# Patient Record
Sex: FEMALE | Race: WHITE | NOT HISPANIC OR LATINO
[De-identification: names, ages, dates, MRNs, and addresses within clinical notes are randomized per-mention and may not be internally consistent; named-entity substitution may affect disease eponyms.]

---

## 2017-10-16 ENCOUNTER — APPOINTMENT (OUTPATIENT)
Dept: DERMATOLOGY | Facility: CLINIC | Age: 62
End: 2017-10-16
Payer: COMMERCIAL

## 2017-10-16 VITALS — DIASTOLIC BLOOD PRESSURE: 58 MMHG | SYSTOLIC BLOOD PRESSURE: 110 MMHG

## 2017-10-16 DIAGNOSIS — L82.0 INFLAMED SEBORRHEIC KERATOSIS: ICD-10-CM

## 2017-10-16 PROCEDURE — 17110 DESTRUCTION B9 LES UP TO 14: CPT

## 2017-10-16 PROCEDURE — 99203 OFFICE O/P NEW LOW 30 MIN: CPT | Mod: 25

## 2017-10-16 PROCEDURE — 17000 DESTRUCT PREMALG LESION: CPT | Mod: 59

## 2017-10-16 RX ORDER — AZITHROMYCIN 250 MG/1
250 TABLET, FILM COATED ORAL
Qty: 6 | Refills: 0 | Status: DISCONTINUED | COMMUNITY
Start: 2017-10-03

## 2017-12-14 ENCOUNTER — RESULT REVIEW (OUTPATIENT)
Age: 62
End: 2017-12-14

## 2018-02-27 ENCOUNTER — APPOINTMENT (OUTPATIENT)
Dept: OPHTHALMOLOGY | Facility: CLINIC | Age: 63
End: 2018-02-27
Payer: COMMERCIAL

## 2018-02-27 PROCEDURE — 92134 CPTRZ OPH DX IMG PST SGM RTA: CPT

## 2018-02-27 PROCEDURE — 92004 COMPRE OPH EXAM NEW PT 1/>: CPT

## 2018-04-10 ENCOUNTER — APPOINTMENT (OUTPATIENT)
Dept: OPHTHALMOLOGY | Facility: CLINIC | Age: 63
End: 2018-04-10
Payer: COMMERCIAL

## 2018-04-10 PROCEDURE — 92014 COMPRE OPH EXAM EST PT 1/>: CPT

## 2018-04-18 ENCOUNTER — MEDICATION RENEWAL (OUTPATIENT)
Age: 63
End: 2018-04-18

## 2018-09-17 ENCOUNTER — APPOINTMENT (OUTPATIENT)
Dept: INFECTIOUS DISEASE | Facility: CLINIC | Age: 63
End: 2018-09-17
Payer: SELF-PAY

## 2018-09-17 PROCEDURE — 90691 TYPHOID VACCINE IM: CPT

## 2018-09-17 PROCEDURE — 90472 IMMUNIZATION ADMIN EACH ADD: CPT | Mod: NC

## 2018-09-17 PROCEDURE — 99401 PREV MED CNSL INDIV APPRX 15: CPT | Mod: 25

## 2018-09-17 PROCEDURE — G0008: CPT | Mod: NC

## 2018-09-17 PROCEDURE — 90686 IIV4 VACC NO PRSV 0.5 ML IM: CPT

## 2019-02-07 ENCOUNTER — APPOINTMENT (OUTPATIENT)
Dept: OPHTHALMOLOGY | Facility: CLINIC | Age: 64
End: 2019-02-07
Payer: COMMERCIAL

## 2019-02-07 DIAGNOSIS — H43.392 OTHER VITREOUS OPACITIES, LEFT EYE: ICD-10-CM

## 2019-02-07 PROCEDURE — 92014 COMPRE OPH EXAM EST PT 1/>: CPT

## 2019-02-07 PROCEDURE — 92134 CPTRZ OPH DX IMG PST SGM RTA: CPT

## 2019-05-13 ENCOUNTER — APPOINTMENT (OUTPATIENT)
Dept: ORTHOPEDIC SURGERY | Facility: CLINIC | Age: 64
End: 2019-05-13
Payer: COMMERCIAL

## 2019-05-13 VITALS — HEIGHT: 63.5 IN | WEIGHT: 110 LBS | BODY MASS INDEX: 19.25 KG/M2

## 2019-05-13 PROCEDURE — 73564 X-RAY EXAM KNEE 4 OR MORE: CPT | Mod: RT

## 2019-05-13 PROCEDURE — 99203 OFFICE O/P NEW LOW 30 MIN: CPT

## 2019-07-02 NOTE — PHYSICAL EXAM
[de-identified] : Oriented to time, place, person\par Mood: Normal\par Affect: Normal\par Appearance: Healthy, well appearing, no acute distress.\par Gait: Normal\par Assistive Devices: None\par \par Right knee exam\par \par Skin: Prior incisions clean, dry, intact\par Inspection: No obvious malalignment, no masses, minimal swelling, trace effusion\par Pulses: 2+ DP/PT pulses\par ROM: 0-125 degrees of flexion. Mild pain with deep knee flexion/extension.\par Tenderness: Minimal MJLT. No LJLT. No pain over the patella facets. No pain to the quadriceps tendon. No pain to the patella tendon. No posterior knee tenderness.\par Stability: Stable to varus, valgus. IA lachman testing. Negative anterior drawer, negative posterior drawer.\par Strength: 5/5 Q/H/TA/GS/EHL, without atrophy\par Neuro: In tact to light touch throughout, DTR's normal\par Additional tests: Neg mild ative McMurrays test, Negative patellar grind test. \par \par  [de-identified] : \par The following radiographs were ordered and read by me during this patients visit. I reviewed each radiograph in detail with the patient and discussed the findings as highlighted below. \par \par 4 views of the right knee were obtained today that show no acute fracture or dislocation. There is no medial, no lateral and min patellofemoral degenerative change seen. There is no significant malalignment. Prior ACL reconstruction with vertical graft

## 2019-07-02 NOTE — DISCUSSION/SUMMARY
[de-identified] : 63-year-old female with right knee pain\par \par The patient presents with pain to the right knee. 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 Described that this is likely due to overuse, and age-related "wear and tear". Pain may be related to breakdown of the chondral surfaces, cartilage, or ligaments of the knee. Radiographs show prior ACL reconstruction with local graft. No significant instability..\par \par Recommendation: Conservative care & observation; including rest/activity avoidance until less symptomatic with subsequent gradual return to full low impact activity as tolerated. Patient may also use OTC NSAID's or acetaminophen as tolerated, with application of ice/heat to the area 2-3x daily for 20 minutes after periods of activity. \par \par Future treatment may include use of advanced imaging to determine degree of internal derangement and arthrosis, injection therapy (cortisone vs. visco-supplementation).\par \par Followup as needed if pain persists for further evaluation and treatment\par

## 2019-07-02 NOTE — HISTORY OF PRESENT ILLNESS
[de-identified] : 63 year old female s/p right knee ACL allograft reconstruction x 10 years ago presents today with right knee pain x 1 week. No injury reported. Patient thinks that her pain is associated with running. The pain is constant  brought on with with walking, and kneeling. Localizes that pain mainly to the posterior aspect of the knee. Reports buckling sensation. Takes Aleve for pain. Denies locking, locking, numbness or tingling. \par \par The patient's past medical history, past surgical history, medications and allergies were reviewed by me today with the patient and documented accordingly. In addition, the patient's family and social history, which were noncontributory to this visit, were reviewed also.\par

## 2019-11-18 ENCOUNTER — TRANSCRIPTION ENCOUNTER (OUTPATIENT)
Age: 64
End: 2019-11-18

## 2019-12-09 ENCOUNTER — APPOINTMENT (OUTPATIENT)
Dept: OPHTHALMOLOGY | Facility: CLINIC | Age: 64
End: 2019-12-09
Payer: COMMERCIAL

## 2019-12-09 ENCOUNTER — NON-APPOINTMENT (OUTPATIENT)
Age: 64
End: 2019-12-09

## 2019-12-09 PROCEDURE — 92014 COMPRE OPH EXAM EST PT 1/>: CPT

## 2019-12-10 ENCOUNTER — TRANSCRIPTION ENCOUNTER (OUTPATIENT)
Age: 64
End: 2019-12-10

## 2020-04-25 ENCOUNTER — MESSAGE (OUTPATIENT)
Age: 65
End: 2020-04-25

## 2020-05-01 LAB
SARS-COV-2 IGG SERPL IA-ACNC: <0.1 INDEX
SARS-COV-2 IGG SERPL QL IA: NEGATIVE

## 2020-10-13 ENCOUNTER — APPOINTMENT (OUTPATIENT)
Dept: ORTHOPEDIC SURGERY | Facility: CLINIC | Age: 65
End: 2020-10-13
Payer: COMMERCIAL

## 2020-10-13 VITALS — HEIGHT: 63 IN | WEIGHT: 110 LBS | BODY MASS INDEX: 19.49 KG/M2

## 2020-10-13 PROCEDURE — 99204 OFFICE O/P NEW MOD 45 MIN: CPT

## 2020-10-13 PROCEDURE — 73610 X-RAY EXAM OF ANKLE: CPT | Mod: LT

## 2020-10-13 PROCEDURE — 73630 X-RAY EXAM OF FOOT: CPT | Mod: LT

## 2020-10-13 NOTE — PHYSICAL EXAM
[de-identified] : Constitutional: Well-nourished, well-developed, No acute distress\par Respiratory:  Good respiratory effort, no SOB\par Lymphatic: No regional lymphadenopathy, no lymphedema\par Psychiatric: Pleasant and normal affect, alert and oriented x3\par Skin: Clean dry and intact B/L UE/LE\par Musculoskeletal: normal except where as noted in regional exam\par \par \par left Foot:\par APPEARANCE: + swelling, bruising, dorsum of foot and ankle\par POSITIVE TENDERNESS: 5th metatarsal base, cuboid, ATFL, deltoid ligament\par ROM: normal throughout foot, ankle, and digits. \par NEURO: Normal sensation of LE, DTRs 2+/4 patella and achilles\par PULSES: 2+ DP/PT pulses\par \par B/L Knees: No asymmetry, malalignment, or swelling, Full ROM, 5/5 strength in Flex/Ext, Joints stable\par \par \par \par  [de-identified] : \par The following radiographs were ordered and read by me during this patient's visit. I reviewed each radiograph in detail with the patient and discussed the findings as highlighted below. \par \par 3 views of the left foot and ankle  were obtained today that show a fracture at the base of the 5th metatarsal.  No evidence of ankle fracture.

## 2020-10-13 NOTE — ADDENDUM
[FreeTextEntry1] : I, Samantha Mcrae ATC, assisted with the history and documentation for Dr. Zheng on this date 10/13/2020.\par

## 2020-10-13 NOTE — HISTORY OF PRESENT ILLNESS
[Pain Location] : pain [Stable] : stable [___ wks] : [unfilled] week(s) ago [5] : an average pain level of 5/10 [4] : a minimum pain level of 4/10 [7] : a maximum pain level of 7/10 [Walking] : walking [Standing] : standing [Ice] : relieved by ice [Physical Therapy] : relieved by physical therapy [de-identified] : DALIA JOSHUA is a 65 year female runner who presents with left foot pain. Pain is primarily located at the lateral ankle. It began on 10/8/2020 while out on a run. She stepped onto a hard fruit or tree nut on the ground and rolled her ankle. She denies any pop, but did have immediate pain and swelling. Pain is described as achy in nature, 5/10 in intensity, worse with plantar flexion and inversion, better with rest and ice. The first two days she took Aleve but is no longer taking pain medication. \par She went to physical therapy that same day [id number] and was sent here to get xrays. No previous injuries. \par \par \par

## 2020-11-10 ENCOUNTER — APPOINTMENT (OUTPATIENT)
Dept: ORTHOPEDIC SURGERY | Facility: CLINIC | Age: 65
End: 2020-11-10
Payer: COMMERCIAL

## 2020-11-10 PROCEDURE — 99072 ADDL SUPL MATRL&STAF TM PHE: CPT

## 2020-11-10 PROCEDURE — 73630 X-RAY EXAM OF FOOT: CPT | Mod: LT

## 2020-11-10 PROCEDURE — 99214 OFFICE O/P EST MOD 30 MIN: CPT

## 2020-11-24 ENCOUNTER — APPOINTMENT (OUTPATIENT)
Dept: ORTHOPEDIC SURGERY | Facility: CLINIC | Age: 65
End: 2020-11-24
Payer: COMMERCIAL

## 2020-11-24 DIAGNOSIS — S92.353A DISPLACED FRACTURE OF FIFTH METATARSAL BONE, UNSPECIFIED FOOT, INITIAL ENCOUNTER FOR CLOSED FRACTURE: ICD-10-CM

## 2020-11-24 PROCEDURE — 73610 X-RAY EXAM OF ANKLE: CPT | Mod: LT

## 2020-11-24 PROCEDURE — 73630 X-RAY EXAM OF FOOT: CPT | Mod: LT

## 2020-11-24 PROCEDURE — 99214 OFFICE O/P EST MOD 30 MIN: CPT

## 2021-03-11 ENCOUNTER — FORM ENCOUNTER (OUTPATIENT)
Age: 66
End: 2021-03-11

## 2021-03-12 ENCOUNTER — RESULT REVIEW (OUTPATIENT)
Age: 66
End: 2021-03-12

## 2021-03-12 ENCOUNTER — APPOINTMENT (OUTPATIENT)
Dept: OBGYN | Facility: CLINIC | Age: 66
End: 2021-03-12
Payer: COMMERCIAL

## 2021-03-12 PROCEDURE — 99386 PREV VISIT NEW AGE 40-64: CPT

## 2021-03-12 PROCEDURE — 99072 ADDL SUPL MATRL&STAF TM PHE: CPT

## 2021-05-03 ENCOUNTER — APPOINTMENT (OUTPATIENT)
Dept: MAMMOGRAPHY | Facility: IMAGING CENTER | Age: 66
End: 2021-05-03
Payer: COMMERCIAL

## 2021-05-03 ENCOUNTER — RESULT REVIEW (OUTPATIENT)
Age: 66
End: 2021-05-03

## 2021-05-03 ENCOUNTER — FORM ENCOUNTER (OUTPATIENT)
Age: 66
End: 2021-05-03

## 2021-05-03 ENCOUNTER — APPOINTMENT (OUTPATIENT)
Dept: RADIOLOGY | Facility: IMAGING CENTER | Age: 66
End: 2021-05-03
Payer: COMMERCIAL

## 2021-05-03 ENCOUNTER — OUTPATIENT (OUTPATIENT)
Dept: OUTPATIENT SERVICES | Facility: HOSPITAL | Age: 66
LOS: 1 days | End: 2021-05-03
Payer: COMMERCIAL

## 2021-05-03 ENCOUNTER — APPOINTMENT (OUTPATIENT)
Dept: ULTRASOUND IMAGING | Facility: IMAGING CENTER | Age: 66
End: 2021-05-03
Payer: COMMERCIAL

## 2021-05-03 DIAGNOSIS — Z00.8 ENCOUNTER FOR OTHER GENERAL EXAMINATION: ICD-10-CM

## 2021-05-03 PROCEDURE — 77067 SCR MAMMO BI INCL CAD: CPT | Mod: 26

## 2021-05-03 PROCEDURE — 77063 BREAST TOMOSYNTHESIS BI: CPT

## 2021-05-03 PROCEDURE — 77063 BREAST TOMOSYNTHESIS BI: CPT | Mod: 26

## 2021-05-03 PROCEDURE — 77067 SCR MAMMO BI INCL CAD: CPT

## 2021-05-03 PROCEDURE — 76641 ULTRASOUND BREAST COMPLETE: CPT | Mod: 26,50

## 2021-05-03 PROCEDURE — 76641 ULTRASOUND BREAST COMPLETE: CPT

## 2021-06-09 ENCOUNTER — APPOINTMENT (OUTPATIENT)
Dept: DERMATOLOGY | Facility: CLINIC | Age: 66
End: 2021-06-09
Payer: COMMERCIAL

## 2021-06-09 ENCOUNTER — NON-APPOINTMENT (OUTPATIENT)
Age: 66
End: 2021-06-09

## 2021-06-09 ENCOUNTER — LABORATORY RESULT (OUTPATIENT)
Age: 66
End: 2021-06-09

## 2021-06-09 VITALS — WEIGHT: 110 LBS | BODY MASS INDEX: 19.49 KG/M2 | HEIGHT: 63 IN

## 2021-06-09 DIAGNOSIS — D48.7 NEOPLASM OF UNCERTAIN BEHAVIOR OF OTHER SPECIFIED SITES: ICD-10-CM

## 2021-06-09 DIAGNOSIS — L81.4 OTHER MELANIN HYPERPIGMENTATION: ICD-10-CM

## 2021-06-09 DIAGNOSIS — D48.5 NEOPLASM OF UNCERTAIN BEHAVIOR OF SKIN: ICD-10-CM

## 2021-06-09 PROCEDURE — 99072 ADDL SUPL MATRL&STAF TM PHE: CPT

## 2021-06-09 PROCEDURE — 99203 OFFICE O/P NEW LOW 30 MIN: CPT | Mod: 25

## 2021-06-09 PROCEDURE — 11103 TANGNTL BX SKIN EA SEP/ADDL: CPT | Mod: 76

## 2021-06-09 PROCEDURE — 11102 TANGNTL BX SKIN SINGLE LES: CPT

## 2021-07-15 ENCOUNTER — NON-APPOINTMENT (OUTPATIENT)
Age: 66
End: 2021-07-15

## 2021-07-21 ENCOUNTER — APPOINTMENT (OUTPATIENT)
Dept: RADIOLOGY | Facility: IMAGING CENTER | Age: 66
End: 2021-07-21
Payer: COMMERCIAL

## 2021-07-21 ENCOUNTER — RESULT REVIEW (OUTPATIENT)
Age: 66
End: 2021-07-21

## 2021-07-21 ENCOUNTER — OUTPATIENT (OUTPATIENT)
Dept: OUTPATIENT SERVICES | Facility: HOSPITAL | Age: 66
LOS: 1 days | End: 2021-07-21
Payer: COMMERCIAL

## 2021-07-21 DIAGNOSIS — Z00.8 ENCOUNTER FOR OTHER GENERAL EXAMINATION: ICD-10-CM

## 2021-07-21 PROCEDURE — 77080 DXA BONE DENSITY AXIAL: CPT | Mod: 26

## 2021-07-21 PROCEDURE — 77080 DXA BONE DENSITY AXIAL: CPT

## 2021-07-27 ENCOUNTER — FORM ENCOUNTER (OUTPATIENT)
Age: 66
End: 2021-07-27

## 2021-08-15 ENCOUNTER — FORM ENCOUNTER (OUTPATIENT)
Age: 66
End: 2021-08-15

## 2021-08-23 ENCOUNTER — APPOINTMENT (OUTPATIENT)
Dept: DERMATOLOGY | Facility: CLINIC | Age: 66
End: 2021-08-23
Payer: COMMERCIAL

## 2021-08-23 PROCEDURE — 17000 DESTRUCT PREMALG LESION: CPT

## 2021-08-31 ENCOUNTER — OUTPATIENT (OUTPATIENT)
Dept: OUTPATIENT SERVICES | Facility: HOSPITAL | Age: 66
LOS: 1 days | End: 2021-08-31
Payer: COMMERCIAL

## 2021-08-31 ENCOUNTER — APPOINTMENT (OUTPATIENT)
Dept: CT IMAGING | Facility: IMAGING CENTER | Age: 66
End: 2021-08-31
Payer: COMMERCIAL

## 2021-08-31 DIAGNOSIS — I77.810 THORACIC AORTIC ECTASIA: ICD-10-CM

## 2021-08-31 PROCEDURE — 74177 CT ABD & PELVIS W/CONTRAST: CPT | Mod: 26

## 2021-08-31 PROCEDURE — 82565 ASSAY OF CREATININE: CPT

## 2021-08-31 PROCEDURE — 74177 CT ABD & PELVIS W/CONTRAST: CPT

## 2021-09-03 ENCOUNTER — APPOINTMENT (OUTPATIENT)
Dept: GASTROENTEROLOGY | Facility: CLINIC | Age: 66
End: 2021-09-03
Payer: COMMERCIAL

## 2021-09-03 VITALS
BODY MASS INDEX: 18.78 KG/M2 | SYSTOLIC BLOOD PRESSURE: 120 MMHG | TEMPERATURE: 98 F | OXYGEN SATURATION: 98 % | DIASTOLIC BLOOD PRESSURE: 75 MMHG | HEIGHT: 63 IN | WEIGHT: 106 LBS | RESPIRATION RATE: 18 BRPM | HEART RATE: 40 BPM

## 2021-09-03 DIAGNOSIS — K85.00 IDIOPATHIC ACUTE PANCREATITIS WITHOUT NECROSIS OR INFECTION: ICD-10-CM

## 2021-09-03 PROCEDURE — 99204 OFFICE O/P NEW MOD 45 MIN: CPT

## 2021-09-03 RX ORDER — TOBRAMYCIN AND DEXAMETHASONE 3; 1 MG/ML; MG/ML
0.3-0.1 SUSPENSION/ DROPS OPHTHALMIC 3 TIMES DAILY
Qty: 5 | Refills: 3 | Status: COMPLETED | COMMUNITY
Start: 2018-04-18 | End: 2021-09-03

## 2021-09-03 RX ORDER — FLUOROURACIL 50 MG/G
5 CREAM TOPICAL
Qty: 1 | Refills: 0 | Status: COMPLETED | COMMUNITY
Start: 2021-06-09 | End: 2021-09-03

## 2021-09-03 RX ORDER — AZITHROMYCIN 250 MG/1
250 TABLET, FILM COATED ORAL
Qty: 6 | Refills: 0 | Status: COMPLETED | COMMUNITY
Start: 2018-09-17 | End: 2021-09-03

## 2021-09-03 NOTE — PHYSICAL EXAM
[General Appearance - Alert] : alert [General Appearance - In No Acute Distress] : in no acute distress [Sclera] : the sclera and conjunctiva were normal [Outer Ear] : the ears and nose were normal in appearance [Neck Appearance] : the appearance of the neck was normal [Abdomen Soft] : soft [Exaggerated Use Of Accessory Muscles For Inspiration] : no accessory muscle use [Abdomen Tenderness] : non-tender [Involuntary Movements] : no involuntary movements were seen [Skin Color & Pigmentation] : normal skin color and pigmentation [Skin Turgor] : normal skin turgor [] : no rash [No Focal Deficits] : no focal deficits [Oriented To Time, Place, And Person] : oriented to person, place, and time [Impaired Insight] : insight and judgment were intact [Affect] : the affect was normal

## 2021-09-03 NOTE — HISTORY OF PRESENT ILLNESS
[de-identified] : DALIA JOSHUA is a 66 year old female with no significant medical history\par \par She is presenting today for evaluation of acute pancreatitis.  She reports that out of the blue around Aug 16, she developed significant epigastric pain that was constant and associated with some nausea. She did not feel like it varied with food or position. She did not have emesis. She saw her PCP who recommended empiric treatment with PPI and Pepcid and bloodwork was obtained and a CT was ordered.  Her bloodwork came back with a lipase elevated at 3900 and otherwise normal CBC, CMP (included TB and liver tests).  She then underwent a CT A/P which showed a 2.7cm septated pancreatic uncinate/inferior head cyst as well as an enhancing focus in the small bowel of uncertain etiology.\par \par Since these tests were obtained, the significant epigastric pain has resolved, though she still notes some gas-like diffuse abdominal discomfort. She was constipated but reports her BMs are returning to normal now.  \par \par \par She drinks 1-2 glasses wine per night regularly but does not binge. She denies a history of pancreatitis in the past. She has no personal or family history of pancreatic disease or cancer.  Her father had gastric cancer at age 90 and her mother had heart failure.  No other cancers that she is aware of in the family. \par \par She had a colonoscopy 4 years ago by Dr. Head that was normal and reports she is due for one in 1 year.\par \par \par \par

## 2021-09-03 NOTE — ASSESSMENT
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 septated pancreatic uncinate/inferior head cyst\par # Gas/bloating\par # Hyper-enhancing nodularity on small bowel on CT\par \par Recs:\par - discussed possible etiologies of acute pancreatitis including this pancreatic cyst, vs ETOH, microlithiasis, etc\par - discussed types of pancreatic cysts, including pseudocysts related to pancreatitis, as well as IPMN, SCA - and that further evaluation should be performed as below to guide future treatment or surveillance plans\par - will get some baseline labs today (lipase, A1c, CA 19-9)\par - will obtain MRI/MRCP to evaluate the pancreatic cyst as well as the biliary tree/PD/pancreatic parenchyma\par - will also schedule for EGD/EUS + likely FNA of the cyst for CEA/amylase and possibly will send for [facility] high risk testing if patient agreeable\par - Will plan to discuss at pancreas cyst conference and referral to center for enrollment in surveillance protocol/addition to database\par - encouraged abstinence from ETOH, adherence to low fat diet \par - advised can stop PPI/Pepcid if not helping abdominal bloating sx and can try peppermint oil capsules\par - RTC after imaging and cyst conference discussion has taken place; at that time can also discuss abnormal CT finding of hyperenhancing nodule in small bowel loop on CT and consider CTE\par

## 2021-09-07 ENCOUNTER — APPOINTMENT (OUTPATIENT)
Dept: MRI IMAGING | Facility: IMAGING CENTER | Age: 66
End: 2021-09-07
Payer: COMMERCIAL

## 2021-09-07 ENCOUNTER — OUTPATIENT (OUTPATIENT)
Dept: OUTPATIENT SERVICES | Facility: HOSPITAL | Age: 66
LOS: 1 days | End: 2021-09-07
Payer: COMMERCIAL

## 2021-09-07 DIAGNOSIS — K85.00 IDIOPATHIC ACUTE PANCREATITIS WITHOUT NECROSIS OR INFECTION: ICD-10-CM

## 2021-09-07 DIAGNOSIS — Z00.8 ENCOUNTER FOR OTHER GENERAL EXAMINATION: ICD-10-CM

## 2021-09-07 PROCEDURE — A9585: CPT

## 2021-09-07 PROCEDURE — 74183 MRI ABD W/O CNTR FLWD CNTR: CPT

## 2021-09-07 PROCEDURE — 74183 MRI ABD W/O CNTR FLWD CNTR: CPT | Mod: 26

## 2021-09-08 ENCOUNTER — TRANSCRIPTION ENCOUNTER (OUTPATIENT)
Age: 66
End: 2021-09-08

## 2021-09-09 ENCOUNTER — TRANSCRIPTION ENCOUNTER (OUTPATIENT)
Age: 66
End: 2021-09-09

## 2021-09-10 LAB
CANCER AG19-9 SERPL-ACNC: 60 U/ML
ESTIMATED AVERAGE GLUCOSE: 114 MG/DL
HBA1C MFR BLD HPLC: 5.6 %
LPL SERPL-CCNC: 154 U/L

## 2021-09-11 ENCOUNTER — TRANSCRIPTION ENCOUNTER (OUTPATIENT)
Age: 66
End: 2021-09-11

## 2021-09-17 ENCOUNTER — TRANSCRIPTION ENCOUNTER (OUTPATIENT)
Age: 66
End: 2021-09-17

## 2021-09-26 ENCOUNTER — FORM ENCOUNTER (OUTPATIENT)
Age: 66
End: 2021-09-26

## 2021-09-26 DIAGNOSIS — Z01.818 ENCOUNTER FOR OTHER PREPROCEDURAL EXAMINATION: ICD-10-CM

## 2021-09-27 ENCOUNTER — APPOINTMENT (OUTPATIENT)
Dept: DISASTER EMERGENCY | Facility: CLINIC | Age: 66
End: 2021-09-27

## 2021-09-28 LAB — SARS-COV-2 N GENE NPH QL NAA+PROBE: NOT DETECTED

## 2021-09-30 ENCOUNTER — RESULT REVIEW (OUTPATIENT)
Age: 66
End: 2021-09-30

## 2021-09-30 ENCOUNTER — OUTPATIENT (OUTPATIENT)
Dept: OUTPATIENT SERVICES | Facility: HOSPITAL | Age: 66
LOS: 1 days | End: 2021-09-30
Payer: COMMERCIAL

## 2021-09-30 ENCOUNTER — APPOINTMENT (OUTPATIENT)
Dept: GASTROENTEROLOGY | Facility: HOSPITAL | Age: 66
End: 2021-09-30

## 2021-09-30 VITALS
RESPIRATION RATE: 19 BRPM | HEART RATE: 55 BPM | SYSTOLIC BLOOD PRESSURE: 138 MMHG | DIASTOLIC BLOOD PRESSURE: 76 MMHG | OXYGEN SATURATION: 100 %

## 2021-09-30 VITALS
SYSTOLIC BLOOD PRESSURE: 150 MMHG | RESPIRATION RATE: 16 BRPM | WEIGHT: 106.04 LBS | DIASTOLIC BLOOD PRESSURE: 73 MMHG | OXYGEN SATURATION: 100 % | HEIGHT: 63 IN | HEART RATE: 52 BPM | TEMPERATURE: 97 F

## 2021-09-30 DIAGNOSIS — Z98.890 OTHER SPECIFIED POSTPROCEDURAL STATES: Chronic | ICD-10-CM

## 2021-09-30 DIAGNOSIS — Z98.891 HISTORY OF UTERINE SCAR FROM PREVIOUS SURGERY: Chronic | ICD-10-CM

## 2021-09-30 DIAGNOSIS — K85.00 IDIOPATHIC ACUTE PANCREATITIS WITHOUT NECROSIS OR INFECTION: ICD-10-CM

## 2021-09-30 PROCEDURE — 88305 TISSUE EXAM BY PATHOLOGIST: CPT

## 2021-09-30 PROCEDURE — 43239 EGD BIOPSY SINGLE/MULTIPLE: CPT | Mod: XS

## 2021-09-30 PROCEDURE — 43238 EGD US FINE NEEDLE BX/ASPIR: CPT

## 2021-09-30 PROCEDURE — 88305 TISSUE EXAM BY PATHOLOGIST: CPT | Mod: 26

## 2021-09-30 PROCEDURE — 88173 CYTOPATH EVAL FNA REPORT: CPT

## 2021-09-30 PROCEDURE — 88173 CYTOPATH EVAL FNA REPORT: CPT | Mod: 26

## 2021-09-30 PROCEDURE — 43239 EGD BIOPSY SINGLE/MULTIPLE: CPT | Mod: 59

## 2021-09-30 PROCEDURE — 43242 EGD US FINE NEEDLE BX/ASPIR: CPT

## 2021-09-30 RX ORDER — SODIUM CHLORIDE 9 MG/ML
500 INJECTION INTRAMUSCULAR; INTRAVENOUS; SUBCUTANEOUS
Refills: 0 | Status: COMPLETED | OUTPATIENT
Start: 2021-09-30 | End: 2021-09-30

## 2021-09-30 RX ADMIN — SODIUM CHLORIDE 30 MILLILITER(S): 9 INJECTION INTRAMUSCULAR; INTRAVENOUS; SUBCUTANEOUS at 08:53

## 2021-09-30 NOTE — ASU PATIENT PROFILE, ADULT - NSICDXPASTSURGICALHX_GEN_ALL_CORE_FT
PAST SURGICAL HISTORY:  H/O  section      PAST SURGICAL HISTORY:  H/O  section     S/P ACL repair

## 2021-09-30 NOTE — PRE PROCEDURE NOTE - PRE PROCEDURE EVALUATION
Attending Physician:     Ion            Procedure: EGD EUS    Indication for Procedure: panc cyst  ________________________________________________________  PAST MEDICAL & SURGICAL HISTORY:    ALLERGIES:  Allergy Status Unknown    HOME MEDICATIONS:    AICD/PPM: [ ] yes   [ ] no    PERTINENT LAB DATA:                      PHYSICAL EXAMINATION:    T(C): --  HR: --  BP: --  RR: --  SpO2: --    Constitutional: NAD  HEENT: PERRLA, EOMI,    Neck:  No JVD  Respiratory: CTAB/L  Cardiovascular: S1 and S2  Gastrointestinal: BS+, soft, NT/ND  Extremities: No peripheral edema  Neurological: A/O x 3, no focal deficits  Psychiatric: Normal mood, normal affect  Skin: No rashes    ASA Class: I [ ]  II [ ]  III [ ]  IV [ ]    COMMENTS:    The patient is a suitable candidate for the planned procedure unless box checked [ ]  No, explain:

## 2021-10-01 ENCOUNTER — TRANSCRIPTION ENCOUNTER (OUTPATIENT)
Age: 66
End: 2021-10-01

## 2021-10-01 LAB — SURGICAL PATHOLOGY STUDY: SIGNIFICANT CHANGE UP

## 2021-10-08 ENCOUNTER — TRANSCRIPTION ENCOUNTER (OUTPATIENT)
Age: 66
End: 2021-10-08

## 2021-10-12 ENCOUNTER — NON-APPOINTMENT (OUTPATIENT)
Age: 66
End: 2021-10-12

## 2021-10-12 LAB — NON-GYNECOLOGICAL CYTOLOGY STUDY: SIGNIFICANT CHANGE UP

## 2021-10-13 ENCOUNTER — TRANSCRIPTION ENCOUNTER (OUTPATIENT)
Age: 66
End: 2021-10-13

## 2021-10-13 ENCOUNTER — NON-APPOINTMENT (OUTPATIENT)
Age: 66
End: 2021-10-13

## 2021-10-27 ENCOUNTER — TRANSCRIPTION ENCOUNTER (OUTPATIENT)
Age: 66
End: 2021-10-27

## 2021-11-09 PROBLEM — Z87.19 PERSONAL HISTORY OF OTHER DISEASES OF THE DIGESTIVE SYSTEM: Chronic | Status: ACTIVE | Noted: 2021-09-30

## 2021-11-16 ENCOUNTER — NON-APPOINTMENT (OUTPATIENT)
Age: 66
End: 2021-11-16

## 2021-11-16 ENCOUNTER — APPOINTMENT (OUTPATIENT)
Dept: INTERNAL MEDICINE | Facility: CLINIC | Age: 66
End: 2021-11-16
Payer: COMMERCIAL

## 2021-11-16 VITALS
WEIGHT: 105 LBS | DIASTOLIC BLOOD PRESSURE: 66 MMHG | BODY MASS INDEX: 18.61 KG/M2 | OXYGEN SATURATION: 100 % | TEMPERATURE: 97.34 F | HEART RATE: 40 BPM | HEIGHT: 63 IN | SYSTOLIC BLOOD PRESSURE: 112 MMHG

## 2021-11-16 PROCEDURE — 93000 ELECTROCARDIOGRAM COMPLETE: CPT | Mod: 59

## 2021-11-16 PROCEDURE — G0444 DEPRESSION SCREEN ANNUAL: CPT | Mod: NC,59

## 2021-11-16 PROCEDURE — 36415 COLL VENOUS BLD VENIPUNCTURE: CPT

## 2021-11-16 PROCEDURE — 99397 PER PM REEVAL EST PAT 65+ YR: CPT | Mod: 25

## 2021-11-16 NOTE — HISTORY OF PRESENT ILLNESS
[FreeTextEntry1] : Annual Physical [de-identified] : DALIA JOSHUA is a 65 yo woman with pancreatic pseudocyst, osteoporosis here for a first physical.  She has been well overall.  Seeing Dr Lemon for pancreatic concern.  Repeat MRI due 1/2022.\par \par Gyn, mammogram, bone density utd.  Repeat colonoscopy due next year with dr jung.  Derm utd.  \par \par The patient is  with 3 children.  She is an  and works in [employer].  She runs 3 miles 5 times weekly without difficulty.  Had the flu shot.  Had shingrix #1.  will consider shingrix #2

## 2021-11-16 NOTE — REVIEW OF SYSTEMS
[Fever] : no fever [Nasal Discharge] : no nasal discharge [Chest Pain] : no chest pain [Shortness Of Breath] : no shortness of breath [Abdominal Pain] : no abdominal pain

## 2021-11-16 NOTE — HEALTH RISK ASSESSMENT
[Good] : ~his/her~  mood as  good [] : No [Yes] : Yes [4 or more  times a week (4 pts)] : 4 or more  times a week (4 points) [1 or 2 (0 pts)] : 1 or 2 (0 points) [No] : In the past 12 months have you used drugs other than those required for medical reasons? No [No falls in past year] : Patient reported no falls in the past year [de-identified] : active [de-identified] : regular [None] : None [With Family] : lives with family [] :  [Feels Safe at Home] : Feels safe at home [Fully functional (bathing, dressing, toileting, transferring, walking, feeding)] : Fully functional (bathing, dressing, toileting, transferring, walking, feeding) [Fully functional (using the telephone, shopping, preparing meals, housekeeping, doing laundry, using] : Fully functional and needs no help or supervision to perform IADLs (using the telephone, shopping, preparing meals, housekeeping, doing laundry, using transportation, managing medications and managing finances) [Reports changes in hearing] : Reports no changes in hearing [Reports changes in vision] : Reports no changes in vision [Smoke Detector] : smoke detector [Carbon Monoxide Detector] : carbon monoxide detector [Seat Belt] :  uses seat belt

## 2021-11-16 NOTE — PHYSICAL EXAM
[No Acute Distress] : no acute distress [Well Nourished] : well nourished [Well Developed] : well developed [Well-Appearing] : well-appearing [Normal Sclera/Conjunctiva] : normal sclera/conjunctiva [EOMI] : extraocular movements intact [Normal Outer Ear/Nose] : the outer ears and nose were normal in appearance [Normal Oropharynx] : the oropharynx was normal [Normal TMs] : both tympanic membranes were normal [Normal Nasal Mucosa] : the nasal mucosa was normal [No Lymphadenopathy] : no lymphadenopathy [Supple] : supple [Thyroid Normal, No Nodules] : the thyroid was normal and there were no nodules present [No Respiratory Distress] : no respiratory distress  [No Accessory Muscle Use] : no accessory muscle use [Clear to Auscultation] : lungs were clear to auscultation bilaterally [Regular Rhythm] : with a regular rhythm [Normal S1, S2] : normal S1 and S2 [No Murmur] : no murmur heard [No Edema] : there was no peripheral edema [Normal Appearance] : normal in appearance [No Masses] : no palpable masses [No Nipple Discharge] : no nipple discharge [No Axillary Lymphadenopathy] : no axillary lymphadenopathy [Soft] : abdomen soft [Non Tender] : non-tender [Non-distended] : non-distended [Normal Bowel Sounds] : normal bowel sounds [Normal Supraclavicular Nodes] : no supraclavicular lymphadenopathy [Normal Posterior Cervical Nodes] : no posterior cervical lymphadenopathy [Normal Anterior Cervical Nodes] : no anterior cervical lymphadenopathy [No CVA Tenderness] : no CVA  tenderness [Coordination Grossly Intact] : coordination grossly intact [No Focal Deficits] : no focal deficits [Normal Gait] : normal gait [Deep Tendon Reflexes (DTR)] : deep tendon reflexes were 2+ and symmetric [Speech Grossly Normal] : speech grossly normal [Memory Grossly Normal] : memory grossly normal [Normal Affect] : the affect was normal [Alert and Oriented x3] : oriented to person, place, and time [Normal Mood] : the mood was normal [Normal Insight/Judgement] : insight and judgment were intact [de-identified] : dense

## 2021-11-16 NOTE — ASSESSMENT
[FreeTextEntry1] : HCM\par Labs ordered\par Gyn, mammogram due next year\par Bone density 2021 utd\par Repeat colonoscopy due next year\par Derm utd\par Tdap due 2022\par PT will consider pneumovax and shingrix #2\par Call placed to Dr Head for last colonoscopy and ekg\par f/u prn or 1 year

## 2021-11-18 LAB
25(OH)D3 SERPL-MCNC: 39.8 NG/ML
ALBUMIN SERPL ELPH-MCNC: 4.6 G/DL
ALP BLD-CCNC: 67 U/L
ALT SERPL-CCNC: 18 U/L
AMYLASE/CREAT SERPL: 115 U/L
ANION GAP SERPL CALC-SCNC: 16 MMOL/L
APPEARANCE: CLEAR
AST SERPL-CCNC: 20 U/L
BASOPHILS # BLD AUTO: 0.06 K/UL
BASOPHILS NFR BLD AUTO: 1.5 %
BILIRUB SERPL-MCNC: 0.4 MG/DL
BILIRUBIN URINE: NEGATIVE
BLOOD URINE: NEGATIVE
BUN SERPL-MCNC: 10 MG/DL
CALCIUM SERPL-MCNC: 10 MG/DL
CANCER AG19-9 SERPL-ACNC: 26 U/ML
CHLORIDE SERPL-SCNC: 104 MMOL/L
CHOLEST SERPL-MCNC: 232 MG/DL
CO2 SERPL-SCNC: 23 MMOL/L
COLOR: COLORLESS
CREAT SERPL-MCNC: 0.91 MG/DL
EOSINOPHIL # BLD AUTO: 0.04 K/UL
EOSINOPHIL NFR BLD AUTO: 1 %
ESTIMATED AVERAGE GLUCOSE: 111 MG/DL
GLUCOSE QUALITATIVE U: NEGATIVE
GLUCOSE SERPL-MCNC: 86 MG/DL
HBA1C MFR BLD HPLC: 5.5 %
HCT VFR BLD CALC: 42.5 %
HDLC SERPL-MCNC: 85 MG/DL
HGB BLD-MCNC: 13.1 G/DL
IMM GRANULOCYTES NFR BLD AUTO: 0.2 %
KETONES URINE: NEGATIVE
LDLC SERPL CALC-MCNC: 133 MG/DL
LEUKOCYTE ESTERASE URINE: NEGATIVE
LPL SERPL-CCNC: 44 U/L
LYMPHOCYTES # BLD AUTO: 1.68 K/UL
LYMPHOCYTES NFR BLD AUTO: 41 %
MAN DIFF?: NORMAL
MCHC RBC-ENTMCNC: 28.8 PG
MCHC RBC-ENTMCNC: 30.8 GM/DL
MCV RBC AUTO: 93.4 FL
MONOCYTES # BLD AUTO: 0.29 K/UL
MONOCYTES NFR BLD AUTO: 7.1 %
NEUTROPHILS # BLD AUTO: 2.02 K/UL
NEUTROPHILS NFR BLD AUTO: 49.2 %
NITRITE URINE: NEGATIVE
NONHDLC SERPL-MCNC: 147 MG/DL
PH URINE: 6
PLATELET # BLD AUTO: 267 K/UL
POTASSIUM SERPL-SCNC: 4.2 MMOL/L
PROT SERPL-MCNC: 7.2 G/DL
PROTEIN URINE: NEGATIVE
RBC # BLD: 4.55 M/UL
RBC # FLD: 12.6 %
SODIUM SERPL-SCNC: 142 MMOL/L
SPECIFIC GRAVITY URINE: 1.01
T4 FREE SERPL-MCNC: 1.1 NG/DL
TRIGL SERPL-MCNC: 70 MG/DL
TSH SERPL-ACNC: 0.52 UIU/ML
UROBILINOGEN URINE: NORMAL
VIT B12 SERPL-MCNC: 502 PG/ML
WBC # FLD AUTO: 4.1 K/UL

## 2022-01-11 ENCOUNTER — APPOINTMENT (OUTPATIENT)
Dept: INFECTIOUS DISEASE | Facility: CLINIC | Age: 67
End: 2022-01-11
Payer: SELF-PAY

## 2022-01-11 DIAGNOSIS — Z71.84 ENC FOR HEALTH COUNSELING RELATED TO TRAVEL: ICD-10-CM

## 2022-01-11 PROCEDURE — 99072 ADDL SUPL MATRL&STAF TM PHE: CPT

## 2022-01-11 PROCEDURE — 90471 IMMUNIZATION ADMIN: CPT

## 2022-01-11 PROCEDURE — 99401 PREV MED CNSL INDIV APPRX 15: CPT | Mod: 25

## 2022-01-11 PROCEDURE — 90691 TYPHOID VACCINE IM: CPT

## 2022-02-18 ENCOUNTER — OUTPATIENT (OUTPATIENT)
Dept: OUTPATIENT SERVICES | Facility: HOSPITAL | Age: 67
LOS: 1 days | End: 2022-02-18
Payer: COMMERCIAL

## 2022-02-18 ENCOUNTER — RESULT REVIEW (OUTPATIENT)
Age: 67
End: 2022-02-18

## 2022-02-18 ENCOUNTER — APPOINTMENT (OUTPATIENT)
Dept: MRI IMAGING | Facility: IMAGING CENTER | Age: 67
End: 2022-02-18
Payer: COMMERCIAL

## 2022-02-18 DIAGNOSIS — K86.2 CYST OF PANCREAS: ICD-10-CM

## 2022-02-18 DIAGNOSIS — Z98.891 HISTORY OF UTERINE SCAR FROM PREVIOUS SURGERY: Chronic | ICD-10-CM

## 2022-02-18 DIAGNOSIS — Z98.890 OTHER SPECIFIED POSTPROCEDURAL STATES: Chronic | ICD-10-CM

## 2022-02-18 PROCEDURE — 74183 MRI ABD W/O CNTR FLWD CNTR: CPT

## 2022-02-18 PROCEDURE — 74183 MRI ABD W/O CNTR FLWD CNTR: CPT | Mod: 26

## 2022-02-18 PROCEDURE — A9585: CPT

## 2022-03-04 ENCOUNTER — APPOINTMENT (OUTPATIENT)
Dept: GASTROENTEROLOGY | Facility: CLINIC | Age: 67
End: 2022-03-04
Payer: COMMERCIAL

## 2022-03-04 VITALS
DIASTOLIC BLOOD PRESSURE: 69 MMHG | TEMPERATURE: 98 F | OXYGEN SATURATION: 100 % | HEIGHT: 63.5 IN | HEART RATE: 51 BPM | BODY MASS INDEX: 18.48 KG/M2 | SYSTOLIC BLOOD PRESSURE: 119 MMHG | WEIGHT: 105.6 LBS

## 2022-03-04 DIAGNOSIS — R93.3 ABNORMAL FINDINGS ON DIAGNOSTIC IMAGING OF OTHER PARTS OF DIGESTIVE TRACT: ICD-10-CM

## 2022-03-04 PROCEDURE — 99214 OFFICE O/P EST MOD 30 MIN: CPT

## 2022-03-04 NOTE — PHYSICAL EXAM
[General Appearance - Alert] : alert [General Appearance - In No Acute Distress] : in no acute distress [Sclera] : the sclera and conjunctiva were normal [Outer Ear] : the ears and nose were normal in appearance [Neck Appearance] : the appearance of the neck was normal [Exaggerated Use Of Accessory Muscles For Inspiration] : no accessory muscle use [Abdomen Soft] : soft [Abdomen Tenderness] : non-tender [Involuntary Movements] : no involuntary movements were seen [Skin Color & Pigmentation] : normal skin color and pigmentation [Skin Turgor] : normal skin turgor [] : no rash [No Focal Deficits] : no focal deficits [Oriented To Time, Place, And Person] : oriented to person, place, and time [Affect] : the affect was normal [Impaired Insight] : insight and judgment were intact

## 2022-03-04 NOTE — HISTORY OF PRESENT ILLNESS
[de-identified] : DALIA JOSHUA is a 66 year old female with a history of pancreatitis and subsequently found IPMN \par \par She is presenting today for followup\par Since her last visit she had her 6mo surveillance MRI that showed a septated uncinate process cyst of 2.2cm without high risk features.  Not significantly changed since prior evaluation.\par She has no further pancreatitis but does note intermittent bloating and pre-prandial abdominal discomfort that resolves with dinner.\par She is not taking anything for this but peppermint did help previously\par She is eating regularly and drinking wine without issues\par Normal bowel movements\par No weight change\par Continues to be very physically active\par Will be having surveillance colon with her GI Dr. Head soon\par \par Noted during pancreas conference that previously seen small bowel lesion was not well visualized on MRI and radiologist suggested followup with CTE \par \par \par \par

## 2022-03-04 NOTE — ASSESSMENT
[FreeTextEntry1] : DALIA JOSHUA is a 66 year old female with a history of pancreatitis and subsequently found IPMN \par \par #2.2cm uncincate process IPMN with septations without high risk features - previously underwent EUS/FNA with no significant concerns on fluid study, now has had 1 year of surveillance\par # Intermittent gas and bloating- likely IBS related\par # Small bowel lesion on outside CT\par \par \par Recs:\par - Discussed that she was presented at last cyst conference - group agreed 6 mo surveillance for 1 more year then annually if no change\par - discussed that this is likely an IPMN (not a pseudocyst) and will not likely disappear hence need for surveillance; also advised that no surgical intervention is being recommended at this point given lack of high risk features\par - for gas/bloating - recommended continuing symptomatic treatment with OTC therapy but to contact me if something changes\par - for small bowel lesion, radiologist during conference recommended followup with CTE - will order and determine course from there\par - RTC in 6 months or so after next surveillance MRI\par - Colonoscopy (routine) with primary GI\par \par \par

## 2022-05-26 ENCOUNTER — OUTPATIENT (OUTPATIENT)
Dept: OUTPATIENT SERVICES | Facility: HOSPITAL | Age: 67
LOS: 1 days | End: 2022-05-26
Payer: COMMERCIAL

## 2022-05-26 ENCOUNTER — APPOINTMENT (OUTPATIENT)
Dept: CT IMAGING | Facility: IMAGING CENTER | Age: 67
End: 2022-05-26
Payer: COMMERCIAL

## 2022-05-26 DIAGNOSIS — Z98.890 OTHER SPECIFIED POSTPROCEDURAL STATES: Chronic | ICD-10-CM

## 2022-05-26 DIAGNOSIS — Z98.891 HISTORY OF UTERINE SCAR FROM PREVIOUS SURGERY: Chronic | ICD-10-CM

## 2022-05-26 DIAGNOSIS — Z00.8 ENCOUNTER FOR OTHER GENERAL EXAMINATION: ICD-10-CM

## 2022-05-26 DIAGNOSIS — R93.3 ABNORMAL FINDINGS ON DIAGNOSTIC IMAGING OF OTHER PARTS OF DIGESTIVE TRACT: ICD-10-CM

## 2022-05-26 PROCEDURE — 74177 CT ABD & PELVIS W/CONTRAST: CPT

## 2022-05-26 PROCEDURE — 74177 CT ABD & PELVIS W/CONTRAST: CPT | Mod: 26

## 2022-05-27 ENCOUNTER — TRANSCRIPTION ENCOUNTER (OUTPATIENT)
Age: 67
End: 2022-05-27

## 2022-06-07 ENCOUNTER — TRANSCRIPTION ENCOUNTER (OUTPATIENT)
Age: 67
End: 2022-06-07

## 2022-10-07 ENCOUNTER — APPOINTMENT (OUTPATIENT)
Dept: GASTROENTEROLOGY | Facility: CLINIC | Age: 67
End: 2022-10-07

## 2022-10-07 VITALS
DIASTOLIC BLOOD PRESSURE: 98 MMHG | BODY MASS INDEX: 16.79 KG/M2 | WEIGHT: 102 LBS | SYSTOLIC BLOOD PRESSURE: 100 MMHG | HEART RATE: 67 BPM | OXYGEN SATURATION: 98 % | TEMPERATURE: 98.7 F | HEIGHT: 65.35 IN

## 2022-10-07 LAB
BASOPHILS # BLD AUTO: 0.05 K/UL
BASOPHILS NFR BLD AUTO: 1 %
EOSINOPHIL # BLD AUTO: 0.06 K/UL
EOSINOPHIL NFR BLD AUTO: 1.3 %
HCT VFR BLD CALC: 39.6 %
HGB BLD-MCNC: 12.8 G/DL
IMM GRANULOCYTES NFR BLD AUTO: 0.2 %
LYMPHOCYTES # BLD AUTO: 1.44 K/UL
LYMPHOCYTES NFR BLD AUTO: 30 %
MAN DIFF?: NORMAL
MCHC RBC-ENTMCNC: 30 PG
MCHC RBC-ENTMCNC: 32.3 GM/DL
MCV RBC AUTO: 92.7 FL
MONOCYTES # BLD AUTO: 0.45 K/UL
MONOCYTES NFR BLD AUTO: 9.4 %
NEUTROPHILS # BLD AUTO: 2.79 K/UL
NEUTROPHILS NFR BLD AUTO: 58.1 %
PLATELET # BLD AUTO: 236 K/UL
RBC # BLD: 4.27 M/UL
RBC # FLD: 12.2 %
WBC # FLD AUTO: 4.8 K/UL

## 2022-10-07 PROCEDURE — 99214 OFFICE O/P EST MOD 30 MIN: CPT

## 2022-10-07 NOTE — ASSESSMENT
[FreeTextEntry1] : Pt. is a 68y/o F with PMH of pancreatitis, IPMN of uncinate process (2.2 cm) who presents with epigastric pain now improviing within 24 hours. She has had bloodwork of cbc, cmp, lipase pending . She needs an updated MRI,MRCP based on prior decided surveillance interval. I will order her the MRI MRCP for her cyst, we will obtain it this month or sooner if she has recurrence of symptoms. She will follow up with Dr. Lemno when she returns and this way will have her repeat MRCP results. I will keep an eye out for her lab results.\par \par \par Akil Benz MD\par [facility]

## 2022-10-07 NOTE — HISTORY OF PRESENT ILLNESS
[FreeTextEntry1] : Pt. is a 66y/o F with PMH of pancreatitis in august/sep, 2021 who subsequently underwent series of imaging which showed a septated IPMN of 2.2 cm in her uncinate process. She underwent an MRCP last in feb, 2022 which showed stable size of the cyst without any high risk features.\par \par She is here today because yesterday her stomach started to hurt. She had progressive abdominal pain in her epigastrium and radiating to her whole abdomen. Her pain was a 7/10 at the end of the day, it was worse than when she last had pancreatitis. She spoke to NP in GI , Kenisha and she asked to be seen in the outpatient clinic but she was unable to see Dr. Mckeon who is covering for Dr. Lemon so she ended up making an appointment with me.\par \par She got home last night with the pain and this morning she feels much better. She thinks its a 3/10 today. She thinks the pain has shifted to her epigastrium. She has given bloodwork this morning at request of Dr. Machuca. \par \par She denies eating anything different yesterday from her usual routine. She had oatmeal, yogurt and a tangerine which is her usual meal. At 8 0 clock last night, she ate half a baked potato and she felt fine after eating it. She was able to sleep the night.

## 2022-10-07 NOTE — REVIEW OF SYSTEMS
[Abdominal Pain] : abdominal pain [Negative] : Heme/Lymph [Fever] : no fever [Vomiting] : no vomiting [Constipation] : no constipation [Diarrhea] : no diarrhea [Heartburn] : no heartburn [Melena (black stool)] : no melena [Bleeding] : no bleeding [Fecal Incontinence (soiling)] : no fecal incontinence [Bloating (gassiness)] : no bloating

## 2022-10-07 NOTE — PHYSICAL EXAM
[Normal] : heart rate was normal and rhythm regular, normal S1 and S2, no murmurs [Bowel Sounds] : normal bowel sounds [Abnormal Walk] : normal gait [Normal Color / Pigmentation] : normal skin color and pigmentation [Cranial Nerves Intact] : cranial nerves 2-12 were intact [Motor Exam] : the motor exam was normal [Oriented To Time, Place, And Person] : oriented to person, place, and time [de-identified] : Mild TTP around epigastric pain Pt. diagnosed COVID+ and pneumonia today, sent from urgent care. Pt. states she feels SOB and chest discomfort when coughing.

## 2022-10-09 LAB
ALBUMIN SERPL ELPH-MCNC: 4.5 G/DL
ALP BLD-CCNC: 54 U/L
ALT SERPL-CCNC: 13 U/L
ANION GAP SERPL CALC-SCNC: 13 MMOL/L
AST SERPL-CCNC: 17 U/L
BILIRUB SERPL-MCNC: 0.5 MG/DL
BUN SERPL-MCNC: 9 MG/DL
CALCIUM SERPL-MCNC: 9.7 MG/DL
CHLORIDE SERPL-SCNC: 102 MMOL/L
CO2 SERPL-SCNC: 23 MMOL/L
CREAT SERPL-MCNC: 0.81 MG/DL
EGFR: 80 ML/MIN/1.73M2
GLUCOSE SERPL-MCNC: 93 MG/DL
LPL SERPL-CCNC: 37 U/L
POTASSIUM SERPL-SCNC: 4.4 MMOL/L
PROT SERPL-MCNC: 6.9 G/DL
SODIUM SERPL-SCNC: 139 MMOL/L

## 2022-10-20 ENCOUNTER — TRANSCRIPTION ENCOUNTER (OUTPATIENT)
Age: 67
End: 2022-10-20

## 2022-10-21 ENCOUNTER — TRANSCRIPTION ENCOUNTER (OUTPATIENT)
Age: 67
End: 2022-10-21

## 2022-10-27 ENCOUNTER — TRANSCRIPTION ENCOUNTER (OUTPATIENT)
Age: 67
End: 2022-10-27

## 2022-10-31 ENCOUNTER — TRANSCRIPTION ENCOUNTER (OUTPATIENT)
Age: 67
End: 2022-10-31

## 2022-11-02 ENCOUNTER — LABORATORY RESULT (OUTPATIENT)
Age: 67
End: 2022-11-02

## 2022-11-04 ENCOUNTER — APPOINTMENT (OUTPATIENT)
Dept: OBGYN | Facility: CLINIC | Age: 67
End: 2022-11-04

## 2022-11-04 VITALS
SYSTOLIC BLOOD PRESSURE: 106 MMHG | BODY MASS INDEX: 18.61 KG/M2 | DIASTOLIC BLOOD PRESSURE: 70 MMHG | HEIGHT: 63 IN | WEIGHT: 105 LBS

## 2022-11-04 LAB
25(OH)D3 SERPL-MCNC: 34.1 NG/ML
ALBUMIN SERPL ELPH-MCNC: 4.5 G/DL
ALP BLD-CCNC: 67 U/L
ALT SERPL-CCNC: 20 U/L
ANION GAP SERPL CALC-SCNC: 11 MMOL/L
APPEARANCE: CLEAR
AST SERPL-CCNC: 24 U/L
BASOPHILS # BLD AUTO: 0.03 K/UL
BASOPHILS NFR BLD AUTO: 0.6 %
BILIRUB SERPL-MCNC: 0.4 MG/DL
BILIRUBIN URINE: NEGATIVE
BLOOD URINE: NEGATIVE
BUN SERPL-MCNC: 11 MG/DL
CALCIUM SERPL-MCNC: 9.9 MG/DL
CHLORIDE SERPL-SCNC: 103 MMOL/L
CHOLEST SERPL-MCNC: 218 MG/DL
CO2 SERPL-SCNC: 26 MMOL/L
COLOR: NORMAL
CREAT SERPL-MCNC: 0.89 MG/DL
EGFR: 71 ML/MIN/1.73M2
EOSINOPHIL # BLD AUTO: 0.06 K/UL
EOSINOPHIL NFR BLD AUTO: 1.3 %
ESTIMATED AVERAGE GLUCOSE: 114 MG/DL
GLUCOSE QUALITATIVE U: NEGATIVE
GLUCOSE SERPL-MCNC: 89 MG/DL
HBA1C MFR BLD HPLC: 5.6 %
HCT VFR BLD CALC: 43.7 %
HDLC SERPL-MCNC: 78 MG/DL
HGB BLD-MCNC: 13.7 G/DL
IMM GRANULOCYTES NFR BLD AUTO: 0.2 %
KETONES URINE: NEGATIVE
LDLC SERPL CALC-MCNC: 124 MG/DL
LEUKOCYTE ESTERASE URINE: ABNORMAL
LYMPHOCYTES # BLD AUTO: 1.05 K/UL
LYMPHOCYTES NFR BLD AUTO: 22 %
MAN DIFF?: NORMAL
MCHC RBC-ENTMCNC: 29.7 PG
MCHC RBC-ENTMCNC: 31.4 GM/DL
MCV RBC AUTO: 94.6 FL
MONOCYTES # BLD AUTO: 0.42 K/UL
MONOCYTES NFR BLD AUTO: 8.8 %
NEUTROPHILS # BLD AUTO: 3.2 K/UL
NEUTROPHILS NFR BLD AUTO: 67.1 %
NITRITE URINE: NEGATIVE
NONHDLC SERPL-MCNC: 140 MG/DL
PH URINE: 6
PLATELET # BLD AUTO: 207 K/UL
POTASSIUM SERPL-SCNC: 4.4 MMOL/L
PROT SERPL-MCNC: 7.3 G/DL
PROTEIN URINE: NORMAL
RBC # BLD: 4.62 M/UL
RBC # FLD: 12.7 %
SODIUM SERPL-SCNC: 140 MMOL/L
SPECIFIC GRAVITY URINE: 1.02
T4 FREE SERPL-MCNC: 1.2 NG/DL
TRIGL SERPL-MCNC: 81 MG/DL
TSH SERPL-ACNC: 1.35 UIU/ML
UROBILINOGEN URINE: NORMAL
VIT B12 SERPL-MCNC: 456 PG/ML
WBC # FLD AUTO: 4.77 K/UL

## 2022-11-04 PROCEDURE — 99397 PER PM REEVAL EST PAT 65+ YR: CPT

## 2022-11-04 NOTE — PLAN
[FreeTextEntry1] : DALIA JOSHUA is a 67 year old presenting for annual GYN exam \par -pap/HPV done today \par -BSE\par -rx for mammo/sono\par -colon scheduled for next week \par -DEXA UTD\par \par RTO 1 year for annual

## 2022-11-04 NOTE — END OF VISIT
[FreeTextEntry3] : I, Cate Mcadams, acted solely as a scribe for Dr. Loren Boss, on 11/04/2022. \par \par All medical record entries made by the scribe were at my, Dr. Loren Boss., direction and personally dictated by me on 11/04/2022. I have personally reviewed the chart and agree that the record accurately reflects my personal performance of the history, physical exam, assessment and plan.\par

## 2022-11-04 NOTE — HISTORY OF PRESENT ILLNESS
[FreeTextEntry1] : DALIA JOSHUA is a 67 year old presenting for annual GYN exam. Pt is doing well and has no complaints.\par \par ob hx: c/s x3\par PSHx:ACL replacement, c/s x3\par NKDA\par Shx: Pt is ,  of  for [facility]\par \par *peds spec [Mammogramdate] : 5/2021 [BreastSonogramDate] : 5/2021 [PapSmeardate] : 3/2021 [BoneDensityDate] : 7/2021 [ColonoscopyDate] : 2018

## 2022-11-07 ENCOUNTER — LABORATORY RESULT (OUTPATIENT)
Age: 67
End: 2022-11-07

## 2022-11-07 ENCOUNTER — APPOINTMENT (OUTPATIENT)
Dept: INTERNAL MEDICINE | Facility: CLINIC | Age: 67
End: 2022-11-07

## 2022-11-07 ENCOUNTER — NON-APPOINTMENT (OUTPATIENT)
Age: 67
End: 2022-11-07

## 2022-11-07 VITALS
BODY MASS INDEX: 18.25 KG/M2 | RESPIRATION RATE: 16 BRPM | HEIGHT: 63 IN | SYSTOLIC BLOOD PRESSURE: 112 MMHG | OXYGEN SATURATION: 99 % | HEART RATE: 43 BPM | DIASTOLIC BLOOD PRESSURE: 64 MMHG | TEMPERATURE: 97.6 F | WEIGHT: 103 LBS

## 2022-11-07 PROCEDURE — G0444 DEPRESSION SCREEN ANNUAL: CPT

## 2022-11-07 PROCEDURE — G0439: CPT

## 2022-11-07 PROCEDURE — 93000 ELECTROCARDIOGRAM COMPLETE: CPT | Mod: 59

## 2022-11-07 RX ORDER — ATOVAQUONE AND PROGUANIL HYDROCHLORIDE 250; 100 MG/1; MG/1
250-100 TABLET, FILM COATED ORAL DAILY
Qty: 20 | Refills: 0 | Status: DISCONTINUED | COMMUNITY
Start: 2022-01-11 | End: 2022-11-07

## 2022-11-07 RX ORDER — AZITHROMYCIN 500 MG/1
500 TABLET, FILM COATED ORAL DAILY
Qty: 5 | Refills: 0 | Status: DISCONTINUED | COMMUNITY
Start: 2022-01-11 | End: 2022-11-07

## 2022-11-07 NOTE — HEALTH RISK ASSESSMENT
[Good] : ~his/her~  mood as  good [Yes] : Yes [4 or more  times a week (4 pts)] : 4 or more  times a week (4 points) [1 or 2 (0 pts)] : 1 or 2 (0 points) [No] : In the past 12 months have you used drugs other than those required for medical reasons? No [No falls in past year] : Patient reported no falls in the past year [None] : None [With Family] : lives with family [] :  [Feels Safe at Home] : Feels safe at home [Fully functional (bathing, dressing, toileting, transferring, walking, feeding)] : Fully functional (bathing, dressing, toileting, transferring, walking, feeding) [Fully functional (using the telephone, shopping, preparing meals, housekeeping, doing laundry, using] : Fully functional and needs no help or supervision to perform IADLs (using the telephone, shopping, preparing meals, housekeeping, doing laundry, using transportation, managing medications and managing finances) [Smoke Detector] : smoke detector [Carbon Monoxide Detector] : carbon monoxide detector [Seat Belt] :  uses seat belt [de-identified] : active [de-identified] : regular [Reports changes in hearing] : Reports no changes in hearing [Reports changes in vision] : Reports no changes in vision

## 2022-11-07 NOTE — ASSESSMENT
[FreeTextEntry1] : HCM\par Labs reviewed with pt today\par UA and urine culture\par Gyn, mammogram when due\par Bone density 2021 utd\par Repeat colonoscopy planned with dr jung\par Derm utd\par Tdap, P 20 and shingrix #2 advised\par f/u prn or 1 year

## 2022-11-07 NOTE — HISTORY OF PRESENT ILLNESS
[FreeTextEntry1] : Annual Physical [de-identified] : DALIA JOSHUA is a 68 yo woman with pancreatic pseudocyst, osteoporosis here for a physical.  She has been well overall.  Seeing Dr Lemon for pancreatic concern.  Repeat MRI utd 2/2022.\par \par Gyn utd dr evans.  Mammogram scheduled.  Bone density utd.  Colonoscopy scheduled with dr jung.  Derm utd dr martínez.  \par \par The patient is  with 3 children.  She is an  and works in [employer].  She runs 3 miles 5 times weekly without difficulty.  Had the flu shot.  Had shingrix #1.  will consider shingrix #2

## 2022-11-07 NOTE — PHYSICAL EXAM
[No Acute Distress] : no acute distress [Well Nourished] : well nourished [Well Developed] : well developed [Well-Appearing] : well-appearing [Normal Sclera/Conjunctiva] : normal sclera/conjunctiva [EOMI] : extraocular movements intact [Normal Outer Ear/Nose] : the outer ears and nose were normal in appearance [Normal Oropharynx] : the oropharynx was normal [Normal TMs] : both tympanic membranes were normal [Normal Nasal Mucosa] : the nasal mucosa was normal [No Lymphadenopathy] : no lymphadenopathy [Supple] : supple [Thyroid Normal, No Nodules] : the thyroid was normal and there were no nodules present [No Respiratory Distress] : no respiratory distress  [No Accessory Muscle Use] : no accessory muscle use [Clear to Auscultation] : lungs were clear to auscultation bilaterally [Regular Rhythm] : with a regular rhythm [Normal S1, S2] : normal S1 and S2 [No Murmur] : no murmur heard [No Edema] : there was no peripheral edema [Normal Appearance] : normal in appearance [No Masses] : no palpable masses [No Nipple Discharge] : no nipple discharge [No Axillary Lymphadenopathy] : no axillary lymphadenopathy [Soft] : abdomen soft [Non Tender] : non-tender [Non-distended] : non-distended [Normal Bowel Sounds] : normal bowel sounds [Normal Supraclavicular Nodes] : no supraclavicular lymphadenopathy [Normal Posterior Cervical Nodes] : no posterior cervical lymphadenopathy [Normal Anterior Cervical Nodes] : no anterior cervical lymphadenopathy [No CVA Tenderness] : no CVA  tenderness [Coordination Grossly Intact] : coordination grossly intact [No Focal Deficits] : no focal deficits [Normal Gait] : normal gait [Deep Tendon Reflexes (DTR)] : deep tendon reflexes were 2+ and symmetric [Speech Grossly Normal] : speech grossly normal [Memory Grossly Normal] : memory grossly normal [Normal Affect] : the affect was normal [Alert and Oriented x3] : oriented to person, place, and time [Normal Mood] : the mood was normal [Normal Insight/Judgement] : insight and judgment were intact [de-identified] : small brown flat spot on left upper outer lip [de-identified] : dense

## 2022-11-08 ENCOUNTER — RESULT REVIEW (OUTPATIENT)
Age: 67
End: 2022-11-08

## 2022-11-08 ENCOUNTER — APPOINTMENT (OUTPATIENT)
Dept: MAMMOGRAPHY | Facility: CLINIC | Age: 67
End: 2022-11-08

## 2022-11-08 ENCOUNTER — APPOINTMENT (OUTPATIENT)
Dept: ULTRASOUND IMAGING | Facility: CLINIC | Age: 67
End: 2022-11-08

## 2022-11-08 LAB
APPEARANCE: CLEAR
BILIRUBIN URINE: NEGATIVE
BLOOD URINE: NEGATIVE
COLOR: NORMAL
GLUCOSE QUALITATIVE U: NEGATIVE
KETONES URINE: NEGATIVE
LEUKOCYTE ESTERASE URINE: ABNORMAL
NITRITE URINE: NEGATIVE
PH URINE: 6
PROTEIN URINE: NEGATIVE
SPECIFIC GRAVITY URINE: 1.02
UROBILINOGEN URINE: NORMAL

## 2022-11-08 PROCEDURE — 77067 SCR MAMMO BI INCL CAD: CPT

## 2022-11-08 PROCEDURE — 77063 BREAST TOMOSYNTHESIS BI: CPT

## 2022-11-08 PROCEDURE — 76641 ULTRASOUND BREAST COMPLETE: CPT | Mod: 50

## 2022-11-09 LAB — BACTERIA UR CULT: NORMAL

## 2022-11-11 ENCOUNTER — TRANSCRIPTION ENCOUNTER (OUTPATIENT)
Age: 67
End: 2022-11-11

## 2022-11-11 LAB
CYTOLOGY CVX/VAG DOC THIN PREP: ABNORMAL
HPV HIGH+LOW RISK DNA PNL CVX: NOT DETECTED

## 2022-12-07 ENCOUNTER — APPOINTMENT (OUTPATIENT)
Dept: GASTROENTEROLOGY | Facility: CLINIC | Age: 67
End: 2022-12-07

## 2022-12-07 VITALS
BODY MASS INDEX: 18.61 KG/M2 | HEIGHT: 63 IN | OXYGEN SATURATION: 100 % | DIASTOLIC BLOOD PRESSURE: 70 MMHG | HEART RATE: 40 BPM | WEIGHT: 105 LBS | SYSTOLIC BLOOD PRESSURE: 134 MMHG

## 2022-12-07 DIAGNOSIS — R10.13 EPIGASTRIC PAIN: ICD-10-CM

## 2022-12-07 PROCEDURE — 99214 OFFICE O/P EST MOD 30 MIN: CPT

## 2022-12-07 NOTE — PHYSICAL EXAM

## 2022-12-07 NOTE — HISTORY OF PRESENT ILLNESS
[FreeTextEntry1] : DALIA JOSHUA is a 67 year old female with a history of pancreatitis and subsequently found IPMN \par \par She is presenting today for followup\par Since our last visit she had an episode of severe abd pain and was seen by my colleague Dr. Benz as she was worried about recurrent pancreatitis.  Her pain had essentially resolved by the time she saw him the next day and labs including lipase and CMP were normal\par \par She has not had the same pain since then\par She does notice a somewhat constant very mild bloating sensation\par She does drink carbonated water, avoids dairy milk and does not consume sugar substitutes\par Has been running daily and overall feels well\par \par \par \par \par

## 2022-12-07 NOTE — ASSESSMENT
[FreeTextEntry1] : DALIA JOSHUA is a 67 year old female with a history of pancreatitis and subsequently found IPMN \par \par #Abdominal pain - no clear evidence of repeat pancreatitis \par # 2.2cm uncinate process IPMN - septation but no other high risk features\par # Bloating\par \par Recs:\par - will continue to survey cyst via cyst clinic with yearly MRI For now, due April 2023\par - for bloating discussed dietary modifications, advised to consider SIBO testing if she is interested and still symptomatic with trialling OTC meds/ peppermint oil/tea etc\par - colonoscopy with primary GI Dr. Head\par - follow up in 1 year or sooner if needed

## 2023-01-13 ENCOUNTER — APPOINTMENT (OUTPATIENT)
Dept: INTERNAL MEDICINE | Facility: CLINIC | Age: 68
End: 2023-01-13

## 2023-01-17 DIAGNOSIS — Z23 ENCOUNTER FOR IMMUNIZATION: ICD-10-CM

## 2023-01-31 ENCOUNTER — APPOINTMENT (OUTPATIENT)
Dept: INTERNAL MEDICINE | Facility: CLINIC | Age: 68
End: 2023-01-31
Payer: COMMERCIAL

## 2023-01-31 PROCEDURE — 90715 TDAP VACCINE 7 YRS/> IM: CPT

## 2023-01-31 PROCEDURE — 90471 IMMUNIZATION ADMIN: CPT

## 2023-02-16 ENCOUNTER — APPOINTMENT (OUTPATIENT)
Dept: INTERNAL MEDICINE | Facility: CLINIC | Age: 68
End: 2023-02-16
Payer: COMMERCIAL

## 2023-02-16 DIAGNOSIS — Z23 ENCOUNTER FOR IMMUNIZATION: ICD-10-CM

## 2023-02-16 PROCEDURE — 90750 HZV VACC RECOMBINANT IM: CPT

## 2023-02-16 PROCEDURE — 90471 IMMUNIZATION ADMIN: CPT

## 2023-03-06 ENCOUNTER — APPOINTMENT (OUTPATIENT)
Dept: INTERNAL MEDICINE | Facility: CLINIC | Age: 68
End: 2023-03-06
Payer: COMMERCIAL

## 2023-03-06 DIAGNOSIS — Z23 ENCOUNTER FOR IMMUNIZATION: ICD-10-CM

## 2023-03-06 PROCEDURE — 90677 PCV20 VACCINE IM: CPT

## 2023-03-06 PROCEDURE — G0009: CPT

## 2023-04-10 ENCOUNTER — APPOINTMENT (OUTPATIENT)
Dept: ORTHOPEDIC SURGERY | Facility: CLINIC | Age: 68
End: 2023-04-10
Payer: COMMERCIAL

## 2023-04-10 VITALS
DIASTOLIC BLOOD PRESSURE: 79 MMHG | BODY MASS INDEX: 18.61 KG/M2 | HEART RATE: 47 BPM | WEIGHT: 105 LBS | SYSTOLIC BLOOD PRESSURE: 129 MMHG | HEIGHT: 63 IN | OXYGEN SATURATION: 98 %

## 2023-04-10 DIAGNOSIS — M25.562 PAIN IN LEFT KNEE: ICD-10-CM

## 2023-04-10 PROCEDURE — 73564 X-RAY EXAM KNEE 4 OR MORE: CPT | Mod: LT

## 2023-04-10 PROCEDURE — 99214 OFFICE O/P EST MOD 30 MIN: CPT | Mod: 25

## 2023-04-10 PROCEDURE — 20611 DRAIN/INJ JOINT/BURSA W/US: CPT | Mod: LT

## 2023-05-04 ENCOUNTER — TRANSCRIPTION ENCOUNTER (OUTPATIENT)
Age: 68
End: 2023-05-04

## 2023-05-17 ENCOUNTER — APPOINTMENT (OUTPATIENT)
Dept: GASTROENTEROLOGY | Facility: CLINIC | Age: 68
End: 2023-05-17
Payer: COMMERCIAL

## 2023-05-17 VITALS
HEART RATE: 53 BPM | OXYGEN SATURATION: 99 % | HEIGHT: 63 IN | TEMPERATURE: 97.5 F | WEIGHT: 103 LBS | SYSTOLIC BLOOD PRESSURE: 126 MMHG | BODY MASS INDEX: 18.25 KG/M2 | DIASTOLIC BLOOD PRESSURE: 76 MMHG

## 2023-05-17 DIAGNOSIS — Z12.11 ENCOUNTER FOR SCREENING FOR MALIGNANT NEOPLASM OF COLON: ICD-10-CM

## 2023-05-17 PROCEDURE — 99214 OFFICE O/P EST MOD 30 MIN: CPT

## 2023-05-17 RX ORDER — SODIUM SULFATE, POTASSIUM SULFATE AND MAGNESIUM SULFATE 1.6; 3.13; 17.5 G/177ML; G/177ML; G/177ML
17.5-3.13-1.6 SOLUTION ORAL
Qty: 1 | Refills: 0 | Status: ACTIVE | COMMUNITY
Start: 2023-05-17 | End: 1900-01-01

## 2023-05-17 NOTE — HISTORY OF PRESENT ILLNESS
[FreeTextEntry1] : DALIA JOSHUA is a 67 year old female with a history of pancreatitis and subsequently found IPMN \par \par She is here for followup of pancreatic cyst and colonoscopy screening\par She is feeling very well\par Daughter wedding in next two weeks\par \par She has no symptoms\par She is awaiting call for MRI/MRCP for panc cyst surveillance\par She is wondering if cyst fluid needs to be resampled\par \par She also is asking about screening colonoscopy\par Last colonoscopy  several years prior by Dr. Head but no longer covered by insurance\par \par

## 2023-05-17 NOTE — ASSESSMENT
[FreeTextEntry1] : ADLIA JOSHUA is a 67 year old female with a history of pancreatitis and subsequently found IPMN \par \par #Pancreatitis, resolved\par # IPMN\par # Gas/bloating\par # Due for surveillance colonoscopy\par \par Recs:\par - MRI/MRCP ordered for pancreatic cyst surveillance, will put back into cyst clinic subsequently\par - will plan for surveillance colonoscopy\par - Benefits and risks of the procedure (including but not limited to pain, infection, bleeding, perforation, injury to internal organs, missed lesions, IV site problems, risks of anesthesia, possible need for further procedures including emergency surgery) were explained to the patient. Alternatives to the procedure were discussed. The patient was agreeable to proceed.\par - no PST needed\par - for gas/bloating, patient prefers to do colonosopy first and address this later (sibo testing? empiric xifaxan?)\par - RTO  after procedures\par

## 2023-05-17 NOTE — PHYSICAL EXAM
[Alert] : alert [Normal Voice/Communication] : normal voice/communication [Healthy Appearing] : healthy appearing [No Acute Distress] : no acute distress [Sclera] : the sclera and conjunctiva were normal [Hearing Threshold Finger Rub Not [place]] : hearing was normal [Normal Lips/Gums] : the lips and gums were normal [Oropharynx] : the oropharynx was normal [Normal Appearance] : the appearance of the neck was normal [No Neck Mass] : no neck mass was observed [No Respiratory Distress] : no respiratory distress [No Acc Muscle Use] : no accessory muscle use [Respiration, Rhythm And Depth] : normal respiratory rhythm and effort [Auscultation Breath Sounds / Voice Sounds] : lungs were clear to auscultation bilaterally [Heart Rate And Rhythm] : heart rate was normal and rhythm regular [Normal S1, S2] : normal S1 and S2 [Murmurs] : no murmurs [Bowel Sounds] : normal bowel sounds [Abdomen Tenderness] : non-tender [No Masses] : no abdominal mass palpated [Abdomen Soft] : soft [] : no hepatosplenomegaly [Oriented To Time, Place, And Person] : oriented to person, place, and time

## 2023-06-27 ENCOUNTER — TRANSCRIPTION ENCOUNTER (OUTPATIENT)
Age: 68
End: 2023-06-27

## 2023-06-27 ENCOUNTER — OUTPATIENT (OUTPATIENT)
Dept: OUTPATIENT SERVICES | Facility: HOSPITAL | Age: 68
LOS: 1 days | End: 2023-06-27
Payer: COMMERCIAL

## 2023-06-27 ENCOUNTER — APPOINTMENT (OUTPATIENT)
Dept: GASTROENTEROLOGY | Facility: HOSPITAL | Age: 68
End: 2023-06-27

## 2023-06-27 VITALS
HEIGHT: 63 IN | OXYGEN SATURATION: 100 % | RESPIRATION RATE: 12 BRPM | SYSTOLIC BLOOD PRESSURE: 137 MMHG | HEART RATE: 45 BPM | WEIGHT: 104.94 LBS | TEMPERATURE: 97 F | DIASTOLIC BLOOD PRESSURE: 71 MMHG

## 2023-06-27 VITALS
HEART RATE: 45 BPM | OXYGEN SATURATION: 100 % | SYSTOLIC BLOOD PRESSURE: 142 MMHG | DIASTOLIC BLOOD PRESSURE: 67 MMHG | RESPIRATION RATE: 18 BRPM

## 2023-06-27 DIAGNOSIS — Z12.11 ENCOUNTER FOR SCREENING FOR MALIGNANT NEOPLASM OF COLON: ICD-10-CM

## 2023-06-27 DIAGNOSIS — Z98.891 HISTORY OF UTERINE SCAR FROM PREVIOUS SURGERY: Chronic | ICD-10-CM

## 2023-06-27 DIAGNOSIS — Z98.890 OTHER SPECIFIED POSTPROCEDURAL STATES: Chronic | ICD-10-CM

## 2023-06-27 PROCEDURE — 45378 DIAGNOSTIC COLONOSCOPY: CPT | Mod: GC

## 2023-06-27 PROCEDURE — 45378 DIAGNOSTIC COLONOSCOPY: CPT

## 2023-06-27 DEVICE — NET RETRV ROT ROTH 2.5MMX230CM: Type: IMPLANTABLE DEVICE | Status: FUNCTIONAL

## 2023-06-27 RX ORDER — SODIUM CHLORIDE 9 MG/ML
500 INJECTION INTRAMUSCULAR; INTRAVENOUS; SUBCUTANEOUS
Refills: 0 | Status: COMPLETED | OUTPATIENT
Start: 2023-06-27 | End: 2023-06-27

## 2023-06-27 RX ADMIN — SODIUM CHLORIDE 75 MILLILITER(S): 9 INJECTION INTRAMUSCULAR; INTRAVENOUS; SUBCUTANEOUS at 09:48

## 2023-06-27 NOTE — PACU DISCHARGE NOTE - NS MD DISCHARGE NOTE DISCHARGE
Keep the laceration clean and covered.  You may shower normally, but do not soak.  Watch closely for any signs of infection and return to the emergency department promptly as needed, otherwise follow-up with your primary care practitioner for suture removal in 7-9 days or sooner as needed.  
Home

## 2023-06-27 NOTE — PRE PROCEDURE NOTE - PRE PROCEDURE EVALUATION
Attending Physician:       Dr. Lemon                     Procedure: colonoscopy     Indication for Procedure: CRC surveillance  ________________________________________________________  PAST MEDICAL & SURGICAL HISTORY:  H/O acute pancreatitis      H/O  section      S/P ACL repair        ALLERGIES:  No Known Allergies    HOME MEDICATIONS:    AICD/PPM: [ ] yes   [x] no    PERTINENT LAB DATA:                      PHYSICAL EXAMINATION:    Height (cm): 160  Weight (kg): 47.6  BMI (kg/m2): 18.6  BSA (m2): 1.47T(C): 36.3  HR: 45  BP: 137/71  RR: 12  SpO2: 100%    Constitutional: NAD   Respiratory: CTAB/L  Cardiovascular: S1 and S2  Gastrointestinal: BS+, soft, NT/ND  Extremities: No peripheral edema  Neurological: A/O x 3, no focal deficits        COMMENTS:    The patient is a suitable candidate for the planned procedure unless box checked [ ]  No, explain:

## 2023-06-27 NOTE — ASU PATIENT PROFILE, ADULT - FALL HARM RISK - UNIVERSAL INTERVENTIONS
Bed in lowest position, wheels locked, appropriate side rails in place/Call bell, personal items and telephone in reach/Instruct patient to call for assistance before getting out of bed or chair/Non-slip footwear when patient is out of bed/[place] to call system/Physically safe environment - no spills, clutter or unnecessary equipment/Purposeful Proactive Rounding/Room/bathroom lighting operational, light cord in reach

## 2023-06-27 NOTE — PACU DISCHARGE NOTE - NSPTMEETSDISCHCRITERIADT_GEN_A_CORE
Post-Op Assessment Note    CV Status:  Stable  Pain Score: 0    Pain management: adequate     Mental Status:  Awake and alert   Hydration Status:  Stable   PONV Controlled:  Controlled   Airway Patency:  Patent      Post Op Vitals Reviewed: Yes      Staff: CRNA         No complications documented      BP   105/55   Temp  97 3   Pulse  75   Resp   13   SpO2   100
27-Jun-2023 09:01

## 2023-07-05 ENCOUNTER — APPOINTMENT (OUTPATIENT)
Dept: MRI IMAGING | Facility: CLINIC | Age: 68
End: 2023-07-05
Payer: COMMERCIAL

## 2023-07-05 ENCOUNTER — OUTPATIENT (OUTPATIENT)
Dept: OUTPATIENT SERVICES | Facility: HOSPITAL | Age: 68
LOS: 1 days | End: 2023-07-05
Payer: SELF-PAY

## 2023-07-05 ENCOUNTER — OUTPATIENT (OUTPATIENT)
Dept: OUTPATIENT SERVICES | Facility: HOSPITAL | Age: 68
LOS: 1 days | End: 2023-07-05
Payer: COMMERCIAL

## 2023-07-05 ENCOUNTER — APPOINTMENT (OUTPATIENT)
Dept: RADIOLOGY | Facility: CLINIC | Age: 68
End: 2023-07-05
Payer: SELF-PAY

## 2023-07-05 DIAGNOSIS — Z98.890 OTHER SPECIFIED POSTPROCEDURAL STATES: Chronic | ICD-10-CM

## 2023-07-05 DIAGNOSIS — Z00.8 ENCOUNTER FOR OTHER GENERAL EXAMINATION: ICD-10-CM

## 2023-07-05 DIAGNOSIS — K86.2 CYST OF PANCREAS: ICD-10-CM

## 2023-07-05 PROCEDURE — A9585: CPT

## 2023-07-05 PROCEDURE — 74018 RADEX ABDOMEN 1 VIEW: CPT | Mod: 26,NC

## 2023-07-05 PROCEDURE — 74018 RADEX ABDOMEN 1 VIEW: CPT

## 2023-07-05 PROCEDURE — 74183 MRI ABD W/O CNTR FLWD CNTR: CPT | Mod: 26

## 2023-07-05 PROCEDURE — 74183 MRI ABD W/O CNTR FLWD CNTR: CPT

## 2023-07-20 ENCOUNTER — TRANSCRIPTION ENCOUNTER (OUTPATIENT)
Age: 68
End: 2023-07-20

## 2023-07-21 ENCOUNTER — TRANSCRIPTION ENCOUNTER (OUTPATIENT)
Age: 68
End: 2023-07-21

## 2023-07-24 ENCOUNTER — APPOINTMENT (OUTPATIENT)
Dept: RADIOLOGY | Facility: IMAGING CENTER | Age: 68
End: 2023-07-24
Payer: COMMERCIAL

## 2023-07-24 ENCOUNTER — OUTPATIENT (OUTPATIENT)
Dept: OUTPATIENT SERVICES | Facility: HOSPITAL | Age: 68
LOS: 1 days | End: 2023-07-24
Payer: COMMERCIAL

## 2023-07-24 DIAGNOSIS — Z98.890 OTHER SPECIFIED POSTPROCEDURAL STATES: Chronic | ICD-10-CM

## 2023-07-24 DIAGNOSIS — Z98.891 HISTORY OF UTERINE SCAR FROM PREVIOUS SURGERY: Chronic | ICD-10-CM

## 2023-07-24 DIAGNOSIS — Z00.8 ENCOUNTER FOR OTHER GENERAL EXAMINATION: ICD-10-CM

## 2023-07-24 PROCEDURE — 77080 DXA BONE DENSITY AXIAL: CPT

## 2023-07-24 PROCEDURE — 77080 DXA BONE DENSITY AXIAL: CPT | Mod: 26

## 2023-07-26 ENCOUNTER — TRANSCRIPTION ENCOUNTER (OUTPATIENT)
Age: 68
End: 2023-07-26

## 2023-10-02 ENCOUNTER — LABORATORY RESULT (OUTPATIENT)
Age: 68
End: 2023-10-02

## 2023-10-04 ENCOUNTER — NON-APPOINTMENT (OUTPATIENT)
Age: 68
End: 2023-10-04

## 2023-10-04 ENCOUNTER — APPOINTMENT (OUTPATIENT)
Dept: INTERNAL MEDICINE | Facility: CLINIC | Age: 68
End: 2023-10-04
Payer: COMMERCIAL

## 2023-10-04 VITALS
WEIGHT: 106 LBS | BODY MASS INDEX: 18.78 KG/M2 | SYSTOLIC BLOOD PRESSURE: 120 MMHG | DIASTOLIC BLOOD PRESSURE: 76 MMHG | TEMPERATURE: 97.2 F | OXYGEN SATURATION: 99 % | HEART RATE: 51 BPM | HEIGHT: 63 IN

## 2023-10-04 DIAGNOSIS — K86.2 CYST OF PANCREAS: ICD-10-CM

## 2023-10-04 DIAGNOSIS — M81.0 AGE-RELATED OSTEOPOROSIS W/OUT CURRENT PATHOLOGICAL FRACTURE: ICD-10-CM

## 2023-10-04 DIAGNOSIS — Z00.00 ENCOUNTER FOR GENERAL ADULT MEDICAL EXAMINATION W/OUT ABNORMAL FINDINGS: ICD-10-CM

## 2023-10-04 DIAGNOSIS — Z23 ENCOUNTER FOR IMMUNIZATION: ICD-10-CM

## 2023-10-04 LAB
25(OH)D3 SERPL-MCNC: 36.2 NG/ML
ALBUMIN SERPL ELPH-MCNC: 4.5 G/DL
ALP BLD-CCNC: 46 U/L
ALT SERPL-CCNC: 15 U/L
ANION GAP SERPL CALC-SCNC: 12 MMOL/L
APPEARANCE: CLEAR
AST SERPL-CCNC: 23 U/L
BASOPHILS # BLD AUTO: 0.06 K/UL
BASOPHILS NFR BLD AUTO: 1.2 %
BILIRUB SERPL-MCNC: 0.3 MG/DL
BILIRUBIN URINE: NEGATIVE
BLOOD URINE: NEGATIVE
BUN SERPL-MCNC: 12 MG/DL
CALCIUM SERPL-MCNC: 10.1 MG/DL
CHLORIDE SERPL-SCNC: 104 MMOL/L
CHOLEST SERPL-MCNC: 238 MG/DL
CO2 SERPL-SCNC: 27 MMOL/L
COLOR: YELLOW
CREAT SERPL-MCNC: 0.83 MG/DL
EGFR: 77 ML/MIN/1.73M2
EOSINOPHIL # BLD AUTO: 0.1 K/UL
EOSINOPHIL NFR BLD AUTO: 2 %
ESTIMATED AVERAGE GLUCOSE: 114 MG/DL
GLUCOSE QUALITATIVE U: NEGATIVE MG/DL
GLUCOSE SERPL-MCNC: 77 MG/DL
HBA1C MFR BLD HPLC: 5.6 %
HCT VFR BLD CALC: 44 %
HDLC SERPL-MCNC: 81 MG/DL
HGB BLD-MCNC: 13.9 G/DL
IMM GRANULOCYTES NFR BLD AUTO: 0.4 %
KETONES URINE: NEGATIVE MG/DL
LDLC SERPL CALC-MCNC: 140 MG/DL
LEUKOCYTE ESTERASE URINE: ABNORMAL
LYMPHOCYTES # BLD AUTO: 2.13 K/UL
LYMPHOCYTES NFR BLD AUTO: 43.6 %
MAN DIFF?: NORMAL
MCHC RBC-ENTMCNC: 29 PG
MCHC RBC-ENTMCNC: 31.6 GM/DL
MCV RBC AUTO: 91.9 FL
MONOCYTES # BLD AUTO: 0.45 K/UL
MONOCYTES NFR BLD AUTO: 9.2 %
NEUTROPHILS # BLD AUTO: 2.13 K/UL
NEUTROPHILS NFR BLD AUTO: 43.6 %
NITRITE URINE: NEGATIVE
NONHDLC SERPL-MCNC: 156 MG/DL
PH URINE: 6
PLATELET # BLD AUTO: 257 K/UL
POTASSIUM SERPL-SCNC: 4.3 MMOL/L
PROT SERPL-MCNC: 7.3 G/DL
PROTEIN URINE: NEGATIVE MG/DL
RBC # BLD: 4.79 M/UL
RBC # FLD: 11.9 %
SODIUM SERPL-SCNC: 143 MMOL/L
SPECIFIC GRAVITY URINE: 1.02
T4 FREE SERPL-MCNC: 1.2 NG/DL
TRIGL SERPL-MCNC: 97 MG/DL
TSH SERPL-ACNC: 0.5 UIU/ML
UROBILINOGEN URINE: 0.2 MG/DL
VIT B12 SERPL-MCNC: 553 PG/ML
WBC # FLD AUTO: 4.89 K/UL

## 2023-10-04 PROCEDURE — 93000 ELECTROCARDIOGRAM COMPLETE: CPT

## 2023-10-04 PROCEDURE — G0008: CPT

## 2023-10-04 PROCEDURE — 90662 IIV NO PRSV INCREASED AG IM: CPT

## 2023-10-04 PROCEDURE — 99397 PER PM REEVAL EST PAT 65+ YR: CPT | Mod: 25

## 2023-10-06 ENCOUNTER — APPOINTMENT (OUTPATIENT)
Dept: DERMATOLOGY | Facility: CLINIC | Age: 68
End: 2023-10-06
Payer: COMMERCIAL

## 2023-10-06 DIAGNOSIS — L57.0 ACTINIC KERATOSIS: ICD-10-CM

## 2023-10-06 DIAGNOSIS — Z12.83 ENCOUNTER FOR SCREENING FOR MALIGNANT NEOPLASM OF SKIN: ICD-10-CM

## 2023-10-06 DIAGNOSIS — D22.9 MELANOCYTIC NEVI, UNSPECIFIED: ICD-10-CM

## 2023-10-06 DIAGNOSIS — L82.1 OTHER SEBORRHEIC KERATOSIS: ICD-10-CM

## 2023-10-06 PROCEDURE — 17000 DESTRUCT PREMALG LESION: CPT

## 2023-10-06 PROCEDURE — 99213 OFFICE O/P EST LOW 20 MIN: CPT | Mod: 25

## 2023-10-24 ENCOUNTER — NON-APPOINTMENT (OUTPATIENT)
Age: 68
End: 2023-10-24

## 2023-10-24 ENCOUNTER — APPOINTMENT (OUTPATIENT)
Dept: OPHTHALMOLOGY | Facility: CLINIC | Age: 68
End: 2023-10-24
Payer: COMMERCIAL

## 2023-10-24 PROCEDURE — 92012 INTRM OPH EXAM EST PATIENT: CPT

## 2023-11-06 ENCOUNTER — APPOINTMENT (OUTPATIENT)
Dept: OBGYN | Facility: CLINIC | Age: 68
End: 2023-11-06
Payer: COMMERCIAL

## 2023-11-06 VITALS
BODY MASS INDEX: 18.61 KG/M2 | DIASTOLIC BLOOD PRESSURE: 62 MMHG | SYSTOLIC BLOOD PRESSURE: 100 MMHG | HEIGHT: 63 IN | WEIGHT: 105 LBS

## 2023-11-06 DIAGNOSIS — Z11.51 ENCOUNTER FOR SCREENING FOR HUMAN PAPILLOMAVIRUS (HPV): ICD-10-CM

## 2023-11-06 DIAGNOSIS — Z01.419 ENCOUNTER FOR GYNECOLOGICAL EXAMINATION (GENERAL) (ROUTINE) W/OUT ABNORMAL FINDINGS: ICD-10-CM

## 2023-11-06 PROCEDURE — 99397 PER PM REEVAL EST PAT 65+ YR: CPT

## 2023-11-07 LAB — HPV HIGH+LOW RISK DNA PNL CVX: NOT DETECTED

## 2023-11-08 ENCOUNTER — TRANSCRIPTION ENCOUNTER (OUTPATIENT)
Age: 68
End: 2023-11-08

## 2023-11-16 ENCOUNTER — TRANSCRIPTION ENCOUNTER (OUTPATIENT)
Age: 68
End: 2023-11-16

## 2023-11-16 LAB — CYTOLOGY CVX/VAG DOC THIN PREP: ABNORMAL

## 2023-11-17 ENCOUNTER — TRANSCRIPTION ENCOUNTER (OUTPATIENT)
Age: 68
End: 2023-11-17

## 2023-11-22 ENCOUNTER — TRANSCRIPTION ENCOUNTER (OUTPATIENT)
Age: 68
End: 2023-11-22

## 2023-12-08 ENCOUNTER — APPOINTMENT (OUTPATIENT)
Dept: ULTRASOUND IMAGING | Facility: CLINIC | Age: 68
End: 2023-12-08

## 2023-12-08 ENCOUNTER — APPOINTMENT (OUTPATIENT)
Dept: MAMMOGRAPHY | Facility: CLINIC | Age: 68
End: 2023-12-08

## 2023-12-12 ENCOUNTER — TRANSCRIPTION ENCOUNTER (OUTPATIENT)
Age: 68
End: 2023-12-12

## 2024-01-03 ENCOUNTER — APPOINTMENT (OUTPATIENT)
Dept: OPHTHALMOLOGY | Facility: CLINIC | Age: 69
End: 2024-01-03
Payer: COMMERCIAL

## 2024-01-03 ENCOUNTER — NON-APPOINTMENT (OUTPATIENT)
Age: 69
End: 2024-01-03

## 2024-01-03 PROCEDURE — 92014 COMPRE OPH EXAM EST PT 1/>: CPT

## 2024-01-05 ENCOUNTER — TRANSCRIPTION ENCOUNTER (OUTPATIENT)
Age: 69
End: 2024-01-05

## 2024-01-08 ENCOUNTER — TRANSCRIPTION ENCOUNTER (OUTPATIENT)
Age: 69
End: 2024-01-08

## 2024-01-16 ENCOUNTER — TRANSCRIPTION ENCOUNTER (OUTPATIENT)
Age: 69
End: 2024-01-16

## 2024-01-17 ENCOUNTER — APPOINTMENT (OUTPATIENT)
Dept: PODIATRY | Facility: CLINIC | Age: 69
End: 2024-01-17
Payer: COMMERCIAL

## 2024-01-17 DIAGNOSIS — M20.42 OTHER HAMMER TOE(S) (ACQUIRED), LEFT FOOT: ICD-10-CM

## 2024-01-17 DIAGNOSIS — G57.62 LESION OF PLANTAR NERVE, LEFT LOWER LIMB: ICD-10-CM

## 2024-01-17 DIAGNOSIS — M20.5X2 OTHER DEFORMITIES OF TOE(S) (ACQUIRED), LEFT FOOT: ICD-10-CM

## 2024-01-17 PROCEDURE — 99203 OFFICE O/P NEW LOW 30 MIN: CPT

## 2024-01-17 PROCEDURE — 73630 X-RAY EXAM OF FOOT: CPT | Mod: LT

## 2024-01-17 NOTE — HISTORY OF PRESENT ILLNESS
[FreeTextEntry1] : Patient presents today with multiple complaints.  1) Painful left 4th toe that is painful at the distal interphalangeal joint and it is beginning to underlap the 3rd toe on the left foot.  She had a previous flexor tenotomy done 10 years ago that did fail.  Upon questioning, she states that she had the deformed toe since birth.  It appears to be a curly toe of the left 4th toe.  2) Burning type pain on the plantar aspect of the foot that feels like a numbness that extends to the 3rd and 4th toes, left foot.   Patient does participate in ballet.

## 2024-01-17 NOTE — PROCEDURE
[FreeTextEntry1] : X-rays were taken to evaluate the deformity. (DP, medial oblique, lateral - left foot) Contracture deformity at the distal interphalangeal joint, left 4th toe.  No evidence of any fracture/dislocation.

## 2024-01-17 NOTE — PHYSICAL EXAM
[2+] : left foot dorsalis pedis 2+ [Aki's Test For Left Radial Artery Patency] : positive left [Skin Color & Pigmentation] : normal skin color and pigmentation [Skin Turgor] : normal skin turgor [Skin Lesions] : no skin lesions [Sensation] : the sensory exam was normal to light touch and pinprick [No Focal Deficits] : no focal deficits [Deep Tendon Reflexes (DTR)] : deep tendon reflexes were 2+ and symmetric [Motor Exam] : the motor exam was normal [General Appearance - Alert] : alert [Ankle Swelling (On Exam)] : not present [Varicose Veins Of Lower Extremities] : not present [] : not present [Delayed in the Right Toes] : capillary refills normal in right toes [Delayed in the Left Toes] : capillary refills normal in the left toes [de-identified] : Forefoot varus, fully compensated.  Rigid contracture, distal interphalangeal joint, left 4th toe beginning to underlap the 3rd toe.  Positive Muldner of the left 3rd interspace, consistent with a neuroma.   [Foot Ulcer] : no foot ulcer [Skin Induration] : no skin induration [Vibration Dec.] : normal vibratory sensation at the level of the toes [Position Sense Dec.] : normal position sense at the level of the toes [Diminished Throughout Right Foot] : normal sensation with monofilament testing throughout right foot [Diminished Throughout Left Foot] : normal sensation with monofilament testing throughout left foot [Oriented To Time, Place, And Person] : oriented to person, place, and time [FreeTextEntry1] : There is a positive Muldner.  Pain within the 3rd interspace.

## 2024-01-17 NOTE — CONSULT LETTER
[Dear  ___] : Dear  [unfilled], [Courtesy Letter:] : I had the pleasure of seeing your patient, [unfilled], in my office today. [Please see my note below.] : Please see my note below. [Consult Closing:] : Thank you very much for allowing me to participate in the care of this patient.  If you have any questions, please do not hesitate to contact me. [Sincerely,] : Sincerely, [FreeTextEntry3] : Charles M. Lombardi, DPM, FACFAS Systems Chief, [facility] Assistant Professor of Surgery [facility] [employer] at [facility]  [FreeTextEntry2] : Kristy Roman MD [address] [place] [address]

## 2024-01-17 NOTE — ASSESSMENT
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   Curly toe, left 4th toe (M20.5x2) with rigid contracture of the distal interphalangeal joint.  Hammertoe, left (M20.42).  Had a long discussion with the patient regarding treatment.  Explained that the only treatment that will solve this would a fusion of the toe, usually with a screw to keep the toe straight.  In terms of the neuroma, discussed change of shoe gear.  If that does not work, would consider an injection.  I do not feel that her symptoms warrant any aggressive intervention.  Patient does participate in [email or web address] and she was made aware that if we do fuse her toes, she will not be able to contract the proximal and distal interphalangeal joints of the digits, which concerns her.  She will consider whether she wants to day anything in the future.  In the interim, demonstrated how to pad the dorsal aspect of the toe to prevent it from rubbing on shoe gear.   Any questions or problems, patient is to contact the office.

## 2024-05-23 ENCOUNTER — APPOINTMENT (OUTPATIENT)
Dept: ORTHOPEDIC SURGERY | Facility: CLINIC | Age: 69
End: 2024-05-23
Payer: COMMERCIAL

## 2024-05-23 VITALS
DIASTOLIC BLOOD PRESSURE: 66 MMHG | OXYGEN SATURATION: 99 % | SYSTOLIC BLOOD PRESSURE: 114 MMHG | HEART RATE: 41 BPM | HEIGHT: 63 IN

## 2024-05-23 DIAGNOSIS — M25.561 PAIN IN RIGHT KNEE: ICD-10-CM

## 2024-05-23 PROCEDURE — 73564 X-RAY EXAM KNEE 4 OR MORE: CPT | Mod: RT

## 2024-05-23 PROCEDURE — 99213 OFFICE O/P EST LOW 20 MIN: CPT | Mod: 25

## 2024-05-23 PROCEDURE — 20611 DRAIN/INJ JOINT/BURSA W/US: CPT | Mod: RT

## 2024-07-03 ENCOUNTER — APPOINTMENT (OUTPATIENT)
Dept: GASTROENTEROLOGY | Facility: CLINIC | Age: 69
End: 2024-07-03

## 2024-07-08 ENCOUNTER — APPOINTMENT (OUTPATIENT)
Dept: MRI IMAGING | Facility: CLINIC | Age: 69
End: 2024-07-08
Payer: COMMERCIAL

## 2024-07-08 ENCOUNTER — OUTPATIENT (OUTPATIENT)
Dept: OUTPATIENT SERVICES | Facility: HOSPITAL | Age: 69
LOS: 1 days | End: 2024-07-08
Payer: COMMERCIAL

## 2024-07-08 DIAGNOSIS — Z98.891 HISTORY OF UTERINE SCAR FROM PREVIOUS SURGERY: Chronic | ICD-10-CM

## 2024-07-08 DIAGNOSIS — Z98.890 OTHER SPECIFIED POSTPROCEDURAL STATES: Chronic | ICD-10-CM

## 2024-07-08 DIAGNOSIS — K86.2 CYST OF PANCREAS: ICD-10-CM

## 2024-07-08 PROCEDURE — A9585: CPT

## 2024-07-08 PROCEDURE — 74183 MRI ABD W/O CNTR FLWD CNTR: CPT

## 2024-07-08 PROCEDURE — 74183 MRI ABD W/O CNTR FLWD CNTR: CPT | Mod: 26

## 2024-07-24 ENCOUNTER — NON-APPOINTMENT (OUTPATIENT)
Age: 69
End: 2024-07-24

## 2024-07-25 ENCOUNTER — APPOINTMENT (OUTPATIENT)
Dept: SURGICAL ONCOLOGY | Facility: CLINIC | Age: 69
End: 2024-07-25
Payer: COMMERCIAL

## 2024-07-25 VITALS
DIASTOLIC BLOOD PRESSURE: 80 MMHG | BODY MASS INDEX: 18.78 KG/M2 | HEIGHT: 63 IN | RESPIRATION RATE: 17 BRPM | OXYGEN SATURATION: 98 % | WEIGHT: 106 LBS | HEART RATE: 44 BPM | SYSTOLIC BLOOD PRESSURE: 136 MMHG

## 2024-07-25 DIAGNOSIS — K86.2 CYST OF PANCREAS: ICD-10-CM

## 2024-07-25 PROCEDURE — 99213 OFFICE O/P EST LOW 20 MIN: CPT

## 2024-07-25 NOTE — PHYSICAL EXAM
[Alert] : alert [Normal Voice/Communication] : normal voice/communication [Healthy Appearing] : healthy appearing [No Acute Distress] : no acute distress [Sclera] : the sclera and conjunctiva were normal [Hearing Threshold Finger Rub Not [place]] : hearing was normal [Normal Lips/Gums] : the lips and gums were normal [Oropharynx] : the oropharynx was normal [Normal Appearance] : the appearance of the neck was normal [No Neck Mass] : no neck mass was observed [No Respiratory Distress] : no respiratory distress [No Acc Muscle Use] : no accessory muscle use [Respiration, Rhythm And Depth] : normal respiratory rhythm and effort [Auscultation Breath Sounds / Voice Sounds] : lungs were clear to auscultation bilaterally [Heart Rate And Rhythm] : heart rate was normal and rhythm regular [Normal S1, S2] : normal S1 and S2 [Murmurs] : no murmurs [Bowel Sounds] : normal bowel sounds [Abdomen Tenderness] : non-tender [No Masses] : no abdominal mass palpated [Abdomen Soft] : soft [] : no hepatosplenomegaly [Oriented To Time, Place, And Person] : oriented to person, place, and time [FreeTextEntry1] : General: No acute distress. Well nourished and well kempt. Head: AT/NC Eyes: PERRL. EOMI. Pulmonary: No respiratory distress. No audible wheezes. No tachypnea and no retractions.  ABD: Soft. NT/ND. No rebound, no guarding, no rigidity.  Extremities: Warm. No edema Neurological: A&O x 4. Psychiatric: Normal affect and mood.

## 2024-07-25 NOTE — ASSESSMENT
[FreeTextEntry1] : 68 year old female who presents for a follow-up visit in our pancreas cyst surveillance clinic.  History of idiopathic pancreatitis August/Sep 2021 and subsequently found IPMN.   Underwent EUS 9/30/21 by Dr Lemon showing a multi-septated cyst  in the head of pancreas measuring 2.6 cm, with no thickened walls or mural nodules and normal main PD.  Cyto:  atypical findings, cyst contents. CEA:  3.6 [place]:  103,465   GNAS / KRAS negative   Surveillance MRI/MRCP 7/8/24 showing 2.0 x 1.8 x 2.4 cm multilobular/multiseptated cystic lesion in the region the pancreatic head, previously 2.0 x 1.7 x 2.5 cm. Reviewed cyst pathophysiology with the patient.  Also reviewed [facility] Pancreas Cyst [facility] with the patient.  We will continue to follow her in our program for scheduled ongoing surveillance.   Reviewed patient's imaging at our pancreas benign conference on 7/24/24 with radiologist, advanced GI and surgical oncology disciplines present, and confirmed stable findings without worrisome features. The consensus at benign conference is for repeat MRI in 1 year to assess stability and follow-up in cyst clinic after MRI.   I discussed symptoms that would raise my concern for malignant transformation including unintended weight loss, jaundice, abdominal/ back pain, and new onset or worsening diabetes. Should these develop, She should call immediately.  If abd/mid-upper back pain develops, she should have lipase level checked at the onset.    Patient verbalized understanding and agrees to plan.   Today, I personally spent 25 minutes in total time including reviewing imaging and studies, discussing treatment regimens, direct face to face time with the patient, patient education and counseling.

## 2024-07-25 NOTE — ASSESSMENT
[FreeTextEntry1] : 68 year old female who presents for a follow-up visit in our pancreas cyst surveillance clinic.  History of idiopathic pancreatitis August/Sep 2021 and subsequently found IPMN.   Underwent EUS 9/30/21 by Dr Lemon showing a multi-septated cyst  in the head of pancreas measuring 2.6 cm, with no thickened walls or mural nodules and normal main PD.  Cyto:  atypical findings, cyst contents. CEA:  3.6 [place]:  103,465   GNAS / KRAS negative   Surveillance MRI/MRCP 7/8/24 showing 2.0 x 1.8 x 2.4 cm multilobular/multiseptated cystic lesion in the region the pancreatic head, previously 2.0 x 1.7 x 2.5 cm. Reviewed cyst pathophysiology with the patient.  Also reviewed [facility] Pancreas Cyst Program with the patient.  We will continue to follow her in our program for scheduled ongoing surveillance.   Reviewed patient's imaging at our pancreas benign conference on 7/24/24 with radiologist, advanced GI and surgical oncology disciplines present, and confirmed stable findings without worrisome features. The consensus at benign conference is for repeat MRI in 1 year to assess stability and follow-up in cyst clinic after MRI.   I discussed symptoms that would raise my concern for malignant transformation including unintended weight loss, jaundice, abdominal/ back pain, and new onset or worsening diabetes. Should these develop, She should call immediately.  If abd/mid-upper back pain develops, she should have lipase level checked at the onset.    Patient verbalized understanding and agrees to plan.   Today, I personally spent 25 minutes in total time including reviewing imaging and studies, discussing treatment regimens, direct face to face time with the patient, patient education and counseling.

## 2024-07-25 NOTE — PHYSICAL EXAM
[Alert] : alert [Normal Voice/Communication] : normal voice/communication [Healthy Appearing] : healthy appearing [No Acute Distress] : no acute distress [Sclera] : the sclera and conjunctiva were normal [Hearing Threshold Finger Rub Not [place]] : hearing was normal [Normal Lips/Gums] : the lips and gums were normal [Oropharynx] : the oropharynx was normal [Normal Appearance] : the appearance of the neck was normal [No Neck Mass] : no neck mass was observed [No Respiratory Distress] : no respiratory distress [No Acc Muscle Use] : no accessory muscle use [Respiration, Rhythm And Depth] : normal respiratory rhythm and effort [Auscultation Breath Sounds / Voice Sounds] : lungs were clear to auscultation bilaterally [Heart Rate And Rhythm] : heart rate was normal and rhythm regular [Normal S1, S2] : normal S1 and S2 [Murmurs] : no murmurs [Bowel Sounds] : normal bowel sounds [Abdomen Tenderness] : non-tender [No Masses] : no abdominal mass palpated [Abdomen Soft] : soft [] : no hepatosplenomegaly [Oriented To Time, Place, And Person] : oriented to person, place, and time [FreeTextEntry1] : General: No acute distress. Well nourished and well kempt. Head: AT/NC Eyes: PERRL. EOMI. Pulmonary: No respiratory distress. No audible wheezes. No tachypnea and no retractions.  ABD: Soft. NT/ND. No rebound, no guarding, no rigidity.  Extremities: Warm. No edema Neurological: A&O x 4. Psychiatric: Normal affect and mood.

## 2024-07-25 NOTE — HISTORY OF PRESENT ILLNESS
[de-identified] : DALIA JOSHUA is a 68 year old female who presents for a follow-up visit in our pancreas cyst surveillance clinic.  History of idiopathic pancreatitis August/Sep 2021 and subsequently found IPMN.   She denies history of heavy ETOH use. Normal gallbladder/ no stones or sludge on imaging. Normal triglycerides. Wasn't on medications prior to pancreatitis episode.  She denies any further pancreatitis episodes since then.   Underwent EUS 9/30/21 by Dr Lemon showing a multi-septated cyst deep in the head of the pancreas. The septations were thin. Overall, the cyst measured 2.6cm in largest dimension, however, the individual components were subcentimeter. There were no thickened walls or mural nodules.  The main PD was normal in course and caliber in the head and throughout the rest of the  pancreas. The rest of the pancreatic parenchyma was normal.   Cyto:  atypical findings, cyst contents.     CEA:  3.6 [place]:  103,465  Glucose (unable to find)   GNAS / KRAS neg   She presents after one year interval surveillance MRI/MRCP 7/8/24 showing 2.0 x 1.8 x 2.4 cm multilobular/multiseptated cystic lesion in the region the pancreatic head, previously 2.0 x 1.7 x 2.5 cm. No pancreatic ductal dilatation. Additional subcentimeter pancreatic cysts are noted.  PMH: osteoporosis. PSH: L knee surgery  Social:   with 3 children. She is an  and works in [employer].  No family history of pancreatic disease or cancer. Father had gastric cancer at age 90.

## 2024-08-12 ENCOUNTER — NON-APPOINTMENT (OUTPATIENT)
Age: 69
End: 2024-08-12

## 2024-08-14 ENCOUNTER — NON-APPOINTMENT (OUTPATIENT)
Age: 69
End: 2024-08-14

## 2024-09-23 ENCOUNTER — TRANSCRIPTION ENCOUNTER (OUTPATIENT)
Age: 69
End: 2024-09-23

## 2024-09-26 ENCOUNTER — TRANSCRIPTION ENCOUNTER (OUTPATIENT)
Age: 69
End: 2024-09-26

## 2024-10-16 ENCOUNTER — APPOINTMENT (OUTPATIENT)
Dept: OBGYN | Facility: CLINIC | Age: 69
End: 2024-10-16
Payer: COMMERCIAL

## 2024-10-16 VITALS
WEIGHT: 106 LBS | SYSTOLIC BLOOD PRESSURE: 126 MMHG | DIASTOLIC BLOOD PRESSURE: 71 MMHG | HEIGHT: 63 IN | BODY MASS INDEX: 18.78 KG/M2

## 2024-10-16 DIAGNOSIS — Z01.419 ENCOUNTER FOR GYNECOLOGICAL EXAMINATION (GENERAL) (ROUTINE) W/OUT ABNORMAL FINDINGS: ICD-10-CM

## 2024-10-16 DIAGNOSIS — Z11.51 ENCOUNTER FOR SCREENING FOR HUMAN PAPILLOMAVIRUS (HPV): ICD-10-CM

## 2024-10-16 PROCEDURE — 99397 PER PM REEVAL EST PAT 65+ YR: CPT

## 2024-10-17 ENCOUNTER — TRANSCRIPTION ENCOUNTER (OUTPATIENT)
Age: 69
End: 2024-10-17

## 2024-10-21 ENCOUNTER — TRANSCRIPTION ENCOUNTER (OUTPATIENT)
Age: 69
End: 2024-10-21

## 2024-10-21 LAB
CYTOLOGY CVX/VAG DOC THIN PREP: ABNORMAL
HPV HIGH+LOW RISK DNA PNL CVX: NOT DETECTED

## 2024-11-13 ENCOUNTER — LABORATORY RESULT (OUTPATIENT)
Age: 69
End: 2024-11-13

## 2024-11-14 LAB
25(OH)D3 SERPL-MCNC: 30.9 NG/ML
ALBUMIN SERPL ELPH-MCNC: 4.3 G/DL
ALP BLD-CCNC: 38 U/L
ALT SERPL-CCNC: 14 U/L
ANION GAP SERPL CALC-SCNC: 12 MMOL/L
APPEARANCE: CLEAR
AST SERPL-CCNC: 17 U/L
BASOPHILS # BLD AUTO: 0.05 K/UL
BASOPHILS NFR BLD AUTO: 1 %
BILIRUB SERPL-MCNC: 0.3 MG/DL
BILIRUBIN URINE: NEGATIVE
BLOOD URINE: NEGATIVE
BUN SERPL-MCNC: 14 MG/DL
CALCIUM SERPL-MCNC: 9.8 MG/DL
CHLORIDE SERPL-SCNC: 106 MMOL/L
CHOLEST SERPL-MCNC: 251 MG/DL
CO2 SERPL-SCNC: 25 MMOL/L
COLOR: YELLOW
CREAT SERPL-MCNC: 0.94 MG/DL
EGFR: 66 ML/MIN/1.73M2
EOSINOPHIL # BLD AUTO: 0.08 K/UL
EOSINOPHIL NFR BLD AUTO: 1.6 %
ESTIMATED AVERAGE GLUCOSE: 105 MG/DL
GLUCOSE QUALITATIVE U: NEGATIVE MG/DL
GLUCOSE SERPL-MCNC: 88 MG/DL
HBA1C MFR BLD HPLC: 5.3 %
HCT VFR BLD CALC: 39.8 %
HDLC SERPL-MCNC: 80 MG/DL
HGB BLD-MCNC: 13 G/DL
IMM GRANULOCYTES NFR BLD AUTO: 0.2 %
KETONES URINE: NEGATIVE MG/DL
LDLC SERPL CALC-MCNC: 159 MG/DL
LEUKOCYTE ESTERASE URINE: ABNORMAL
LYMPHOCYTES # BLD AUTO: 1.36 K/UL
LYMPHOCYTES NFR BLD AUTO: 26.7 %
MAN DIFF?: NORMAL
MCHC RBC-ENTMCNC: 29.1 PG
MCHC RBC-ENTMCNC: 32.7 G/DL
MCV RBC AUTO: 89.2 FL
MONOCYTES # BLD AUTO: 0.43 K/UL
MONOCYTES NFR BLD AUTO: 8.4 %
NEUTROPHILS # BLD AUTO: 3.16 K/UL
NEUTROPHILS NFR BLD AUTO: 62.1 %
NITRITE URINE: NEGATIVE
NONHDLC SERPL-MCNC: 171 MG/DL
PH URINE: 5.5
PLATELET # BLD AUTO: 249 K/UL
POTASSIUM SERPL-SCNC: 4.6 MMOL/L
PROT SERPL-MCNC: 7.1 G/DL
PROTEIN URINE: NEGATIVE MG/DL
RBC # BLD: 4.46 M/UL
RBC # FLD: 12.3 %
SODIUM SERPL-SCNC: 142 MMOL/L
SPECIFIC GRAVITY URINE: 1.02
T4 FREE SERPL-MCNC: 1.2 NG/DL
TRIGL SERPL-MCNC: 73 MG/DL
TSH SERPL-ACNC: 0.69 UIU/ML
UROBILINOGEN URINE: 0.2 MG/DL
VIT B12 SERPL-MCNC: 432 PG/ML
WBC # FLD AUTO: 5.09 K/UL

## 2024-11-18 ENCOUNTER — NON-APPOINTMENT (OUTPATIENT)
Age: 69
End: 2024-11-18

## 2024-11-18 ENCOUNTER — APPOINTMENT (OUTPATIENT)
Dept: INTERNAL MEDICINE | Facility: CLINIC | Age: 69
End: 2024-11-18
Payer: COMMERCIAL

## 2024-11-18 VITALS
HEART RATE: 47 BPM | SYSTOLIC BLOOD PRESSURE: 150 MMHG | WEIGHT: 106 LBS | HEIGHT: 63 IN | OXYGEN SATURATION: 97 % | DIASTOLIC BLOOD PRESSURE: 82 MMHG | TEMPERATURE: 97.2 F | BODY MASS INDEX: 18.78 KG/M2

## 2024-11-18 VITALS — DIASTOLIC BLOOD PRESSURE: 74 MMHG | SYSTOLIC BLOOD PRESSURE: 126 MMHG

## 2024-11-18 DIAGNOSIS — M81.0 AGE-RELATED OSTEOPOROSIS W/OUT CURRENT PATHOLOGICAL FRACTURE: ICD-10-CM

## 2024-11-18 DIAGNOSIS — Z00.00 ENCOUNTER FOR GENERAL ADULT MEDICAL EXAMINATION W/OUT ABNORMAL FINDINGS: ICD-10-CM

## 2024-11-18 DIAGNOSIS — K86.2 CYST OF PANCREAS: ICD-10-CM

## 2024-11-18 PROCEDURE — 99397 PER PM REEVAL EST PAT 65+ YR: CPT

## 2024-11-18 PROCEDURE — 93000 ELECTROCARDIOGRAM COMPLETE: CPT

## 2024-12-02 ENCOUNTER — APPOINTMENT (OUTPATIENT)
Dept: ULTRASOUND IMAGING | Facility: IMAGING CENTER | Age: 69
End: 2024-12-02
Payer: COMMERCIAL

## 2024-12-02 ENCOUNTER — APPOINTMENT (OUTPATIENT)
Dept: MAMMOGRAPHY | Facility: IMAGING CENTER | Age: 69
End: 2024-12-02
Payer: COMMERCIAL

## 2024-12-02 ENCOUNTER — RESULT REVIEW (OUTPATIENT)
Age: 69
End: 2024-12-02

## 2024-12-02 ENCOUNTER — OUTPATIENT (OUTPATIENT)
Dept: OUTPATIENT SERVICES | Facility: HOSPITAL | Age: 69
LOS: 1 days | End: 2024-12-02
Payer: COMMERCIAL

## 2024-12-02 DIAGNOSIS — Z00.8 ENCOUNTER FOR OTHER GENERAL EXAMINATION: ICD-10-CM

## 2024-12-02 PROCEDURE — 77067 SCR MAMMO BI INCL CAD: CPT | Mod: 26

## 2024-12-02 PROCEDURE — 77063 BREAST TOMOSYNTHESIS BI: CPT | Mod: 26

## 2024-12-02 PROCEDURE — 77063 BREAST TOMOSYNTHESIS BI: CPT

## 2024-12-02 PROCEDURE — 76641 ULTRASOUND BREAST COMPLETE: CPT

## 2024-12-02 PROCEDURE — 77067 SCR MAMMO BI INCL CAD: CPT

## 2024-12-02 PROCEDURE — 76641 ULTRASOUND BREAST COMPLETE: CPT | Mod: 26,50

## 2024-12-18 ENCOUNTER — APPOINTMENT (OUTPATIENT)
Dept: INFECTIOUS DISEASE | Facility: CLINIC | Age: 69
End: 2024-12-18

## 2024-12-18 PROCEDURE — 99401 PREV MED CNSL INDIV APPRX 15: CPT

## 2024-12-18 PROCEDURE — 90690 TYPHOID VACCINE ORAL: CPT

## 2024-12-18 PROCEDURE — 90473 IMMUNE ADMIN ORAL/NASAL: CPT

## 2024-12-18 RX ORDER — AZITHROMYCIN 500 MG/1
500 TABLET, FILM COATED ORAL DAILY
Qty: 2 | Refills: 0 | Status: ACTIVE | COMMUNITY
Start: 2024-12-18 | End: 1900-01-01

## 2025-03-25 ENCOUNTER — NON-APPOINTMENT (OUTPATIENT)
Age: 70
End: 2025-03-25

## 2025-03-25 ENCOUNTER — APPOINTMENT (OUTPATIENT)
Dept: PHYSICAL MEDICINE AND REHAB | Facility: CLINIC | Age: 70
End: 2025-03-25
Payer: COMMERCIAL

## 2025-03-25 DIAGNOSIS — M54.50 LOW BACK PAIN, UNSPECIFIED: ICD-10-CM

## 2025-03-25 DIAGNOSIS — M47.816 SPONDYLOSIS W/OUT MYELOPATHY OR RADICULOPATHY, LUMBAR REGION: ICD-10-CM

## 2025-03-25 PROCEDURE — 99213 OFFICE O/P EST LOW 20 MIN: CPT

## 2025-03-25 RX ORDER — CYCLOBENZAPRINE HYDROCHLORIDE 5 MG/1
5 TABLET, FILM COATED ORAL
Qty: 60 | Refills: 1 | Status: ACTIVE | COMMUNITY
Start: 2025-03-25 | End: 1900-01-01

## 2025-03-25 RX ORDER — MELOXICAM 7.5 MG/1
7.5 TABLET ORAL TWICE DAILY
Qty: 28 | Refills: 2 | Status: ACTIVE | COMMUNITY
Start: 2025-03-25 | End: 1900-01-01

## 2025-06-24 ENCOUNTER — TRANSCRIPTION ENCOUNTER (OUTPATIENT)
Age: 70
End: 2025-06-24

## 2025-07-17 ENCOUNTER — OUTPATIENT (OUTPATIENT)
Dept: OUTPATIENT SERVICES | Facility: HOSPITAL | Age: 70
LOS: 1 days | End: 2025-07-17
Payer: COMMERCIAL

## 2025-07-17 ENCOUNTER — APPOINTMENT (OUTPATIENT)
Dept: MRI IMAGING | Facility: IMAGING CENTER | Age: 70
End: 2025-07-17

## 2025-07-17 DIAGNOSIS — Z98.890 OTHER SPECIFIED POSTPROCEDURAL STATES: Chronic | ICD-10-CM

## 2025-07-17 DIAGNOSIS — K86.2 CYST OF PANCREAS: ICD-10-CM

## 2025-07-17 DIAGNOSIS — Z00.8 ENCOUNTER FOR OTHER GENERAL EXAMINATION: ICD-10-CM

## 2025-07-17 DIAGNOSIS — Z98.891 HISTORY OF UTERINE SCAR FROM PREVIOUS SURGERY: Chronic | ICD-10-CM

## 2025-07-17 PROCEDURE — 74183 MRI ABD W/O CNTR FLWD CNTR: CPT

## 2025-07-17 PROCEDURE — A9585: CPT

## 2025-07-17 PROCEDURE — 74183 MRI ABD W/O CNTR FLWD CNTR: CPT | Mod: 26

## 2025-07-24 ENCOUNTER — APPOINTMENT (OUTPATIENT)
Dept: SURGICAL ONCOLOGY | Facility: CLINIC | Age: 70
End: 2025-07-24
Payer: MEDICARE

## 2025-07-24 VITALS
HEIGHT: 63 IN | BODY MASS INDEX: 18.61 KG/M2 | SYSTOLIC BLOOD PRESSURE: 104 MMHG | WEIGHT: 105 LBS | OXYGEN SATURATION: 99 % | DIASTOLIC BLOOD PRESSURE: 66 MMHG | HEART RATE: 54 BPM

## 2025-07-24 DIAGNOSIS — K86.2 CYST OF PANCREAS: ICD-10-CM

## 2025-07-24 PROCEDURE — 99213 OFFICE O/P EST LOW 20 MIN: CPT

## 2025-08-28 ENCOUNTER — TRANSCRIPTION ENCOUNTER (OUTPATIENT)
Age: 70
End: 2025-08-28

## 2025-08-29 ENCOUNTER — TRANSCRIPTION ENCOUNTER (OUTPATIENT)
Age: 70
End: 2025-08-29

## 2025-09-11 ENCOUNTER — APPOINTMENT (OUTPATIENT)
Dept: RADIOLOGY | Facility: IMAGING CENTER | Age: 70
End: 2025-09-11
Payer: MEDICARE

## 2025-09-11 PROCEDURE — 77080 DXA BONE DENSITY AXIAL: CPT | Mod: 26

## 2025-09-19 ENCOUNTER — APPOINTMENT (OUTPATIENT)
Dept: INTERNAL MEDICINE | Facility: CLINIC | Age: 70
End: 2025-09-19
Payer: MEDICARE

## 2025-09-19 VITALS
HEIGHT: 62.25 IN | HEART RATE: 51 BPM | DIASTOLIC BLOOD PRESSURE: 60 MMHG | BODY MASS INDEX: 18.9 KG/M2 | OXYGEN SATURATION: 99 % | SYSTOLIC BLOOD PRESSURE: 100 MMHG | WEIGHT: 104 LBS | TEMPERATURE: 97.9 F

## 2025-09-19 DIAGNOSIS — M81.0 AGE-RELATED OSTEOPOROSIS W/OUT CURRENT PATHOLOGICAL FRACTURE: ICD-10-CM

## 2025-09-19 DIAGNOSIS — K86.2 CYST OF PANCREAS: ICD-10-CM

## 2025-09-19 DIAGNOSIS — Z00.00 ENCOUNTER FOR GENERAL ADULT MEDICAL EXAMINATION W/OUT ABNORMAL FINDINGS: ICD-10-CM

## 2025-09-19 PROCEDURE — 99204 OFFICE O/P NEW MOD 45 MIN: CPT | Mod: 25

## 2025-09-19 PROCEDURE — 90662 IIV NO PRSV INCREASED AG IM: CPT

## 2025-09-19 PROCEDURE — G0008: CPT

## 2025-09-19 PROCEDURE — G0439: CPT

## 2025-09-19 PROCEDURE — 93000 ELECTROCARDIOGRAM COMPLETE: CPT

## (undated) DEVICE — SUCTION YANKAUER NO CONTROL VENT

## (undated) DEVICE — IRRIGATOR BIO SHIELD

## (undated) DEVICE — BRUSH COLONOSCOPY CYTOLOGY

## (undated) DEVICE — FOLEY HOLDER STATLOCK 2 WAY ADULT

## (undated) DEVICE — TUBING SUCTION 20FT

## (undated) DEVICE — TUBING IV SET GRAVITY 3Y 100" MACRO

## (undated) DEVICE — CLAMP BX HOT RAD JAW 3

## (undated) DEVICE — CATH IV SAFE BC 22G X 1" (BLUE)

## (undated) DEVICE — SENSOR O2 FINGER ADULT

## (undated) DEVICE — POLY TRAP ETRAP

## (undated) DEVICE — PACK IV START WITH CHG

## (undated) DEVICE — FORCEP RADIAL JAW 4 JUMBO 2.8MM 3.2MM 240CM ORANGE DISP

## (undated) DEVICE — BIOPSY FORCEP RADIAL JAW 4 STANDARD WITH NEEDLE

## (undated) DEVICE — SOL INJ NS 0.9% 500ML 2 PORT

## (undated) DEVICE — TUBING SUCTION CONN 6FT STERILE

## (undated) DEVICE — SYR LUER LOK 50CC

## (undated) DEVICE — CATH IV SAFE BC 20G X 1.16" (PINK)

## (undated) DEVICE — ELCTR GROUNDING PAD ADULT COVIDIEN